# Patient Record
Sex: MALE | Race: WHITE | NOT HISPANIC OR LATINO | Employment: OTHER | URBAN - METROPOLITAN AREA
[De-identification: names, ages, dates, MRNs, and addresses within clinical notes are randomized per-mention and may not be internally consistent; named-entity substitution may affect disease eponyms.]

---

## 2018-12-07 ENCOUNTER — OFFICE VISIT (OUTPATIENT)
Dept: INTERNAL MEDICINE | Facility: CLINIC | Age: 73
End: 2018-12-07

## 2018-12-07 VITALS
OXYGEN SATURATION: 98 % | RESPIRATION RATE: 14 BRPM | HEART RATE: 77 BPM | HEIGHT: 71 IN | SYSTOLIC BLOOD PRESSURE: 144 MMHG | DIASTOLIC BLOOD PRESSURE: 84 MMHG | WEIGHT: 173 LBS | BODY MASS INDEX: 24.22 KG/M2

## 2018-12-07 DIAGNOSIS — D36.9 TUBULAR ADENOMA: Primary | ICD-10-CM

## 2018-12-07 DIAGNOSIS — N52.1 ERECTILE DYSFUNCTION DUE TO DISEASES CLASSIFIED ELSEWHERE: ICD-10-CM

## 2018-12-07 DIAGNOSIS — L57.0 ACTINIC KERATOSIS: ICD-10-CM

## 2018-12-07 PROCEDURE — 99204 OFFICE O/P NEW MOD 45 MIN: CPT | Performed by: INTERNAL MEDICINE

## 2018-12-07 NOTE — PROGRESS NOTES
"      Zachary Zayas is a 73 y.o. male, who presents with a chief complaint of   Chief Complaint   Patient presents with   • Establish Care   • Erectile Dysfunction       HPI       74 yo male with pmhx possible BPH, Asthma and allergy ([facility] - every other week),   Had CPE this year previously seeing Dr. Stuart correa blood work up to date.      Gets his medication from [facility].     Strong family history of CAD, father with AMI at 43      The following portions of the patient's history were reviewed and updated as appropriate: allergies, current medications, past family history, past medical history, past social history, past surgical history and problem list.    Allergies: Lactose intolerance (gi)    Current Outpatient Medications:   •  Calcium Carb-Cholecalciferol (CALCIUM 600 + D PO), Take  by mouth., Disp: , Rfl:   •  GLUCOSAMINE CHONDROITIN COMPLX PO, Take  by mouth., Disp: , Rfl:   •  Lysine HCl (L-LYSINE) 500 MG tablet tablet, Take  by mouth daily., Disp: , Rfl:   •  Misc Natural Products (SAW PALMETTO COMPLEX EX ST PO), Take  by mouth., Disp: , Rfl:   •  tadalafil (CIALIS) 10 MG tablet, Take 10 mg by mouth daily as needed for erectile dysfunction., Disp: , Rfl:   There are no discontinued medications.    Review of Systems   Constitutional: Negative.    HENT: Negative.    Respiratory: Negative.    Cardiovascular: Negative.    Gastrointestinal: Negative.    Genitourinary: Negative.    Musculoskeletal: Negative.  Negative for arthralgias and myalgias.   Neurological: Negative.    Hematological: Negative.    Psychiatric/Behavioral: Negative.              /84   Pulse 77   Resp 14   Ht 180.3 cm (71\")   Wt 78.5 kg (173 lb)   SpO2 98%   BMI 24.13 kg/m²       Physical Exam   Constitutional: He is oriented to person, place, and time. He appears well-developed and well-nourished.   HENT:   Head: Normocephalic and atraumatic.   Right Ear: External ear normal.   Left Ear: External ear normal.   Nose: Nose " normal.   Mouth/Throat: Oropharynx is clear and moist. No oropharyngeal exudate.   Eyes: Conjunctivae and EOM are normal. Pupils are equal, round, and reactive to light. Right eye exhibits no discharge. Left eye exhibits no discharge.   Neck: Normal range of motion. Neck supple. No thyromegaly present.   Cardiovascular: Normal rate, regular rhythm and intact distal pulses. Exam reveals no friction rub.   No murmur heard.  Pulmonary/Chest: Effort normal and breath sounds normal.   Abdominal: Soft. Bowel sounds are normal. He exhibits no distension.   Musculoskeletal: Normal range of motion. He exhibits no edema.   Neurological: He is alert and oriented to person, place, and time. He has normal reflexes. No cranial nerve deficit.   Skin: Skin is warm and dry. Capillary refill takes less than 2 seconds.   Actinic ekeratosis   Psychiatric: He has a normal mood and affect. His behavior is normal. Judgment and thought content normal.   Nursing note and vitals reviewed.      Lab Results (most recent)     None          Results for orders placed or performed in visit on 10/13/16   Comprehensive Metabolic Panel   Result Value Ref Range    Glucose 80 65 - 99 mg/dL    BUN 12 8 - 23 mg/dL    Creatinine 0.88 0.76 - 1.27 mg/dL    eGFR Non African Am 86 >60 mL/min/1.73    eGFR African Am 104 >60 mL/min/1.73    BUN/Creatinine Ratio 13.6 7.0 - 25.0    Sodium 144 136 - 145 mmol/L    Potassium 4.9 3.5 - 5.2 mmol/L    Chloride 105 98 - 107 mmol/L    Total CO2 25.8 22.0 - 29.0 mmol/L    Calcium 9.6 8.6 - 10.5 mg/dL    Total Protein 6.3 6.0 - 8.5 g/dL    Albumin 4.30 3.50 - 5.20 g/dL    Globulin 2.0 gm/dL    A/G Ratio 2.2 g/dL    Total Bilirubin 0.8 0.1 - 1.2 mg/dL    Alkaline Phosphatase 64 39 - 117 U/L    AST (SGOT) 18 1 - 40 U/L    ALT (SGPT) 20 1 - 41 U/L   Vitamin D 25 Hydroxy   Result Value Ref Range    25 Hydroxy, Vitamin D 38.9 30.0 - 100.0 ng/mL   TSH   Result Value Ref Range    TSH 2.070 0.270 - 4.200 mIU/mL   Thyroid Antibodies    Result Value Ref Range    Thyroid Peroxidase Antibody 12 0 - 34 IU/mL    Thyroglobulin Ab <1.0 0.0 - 0.9 IU/mL   Thyroglobulin   Result Value Ref Range    Thyroglobulin (TG-LIZ) 19 ng/mL   T4, Free   Result Value Ref Range    Free T4 1.08 0.93 - 1.70 ng/dL   T4   Result Value Ref Range    T4, Total 5.58 4.50 - 11.70 mcg/dL   T3, Free   Result Value Ref Range    T3, Free 3.1 2.0 - 4.4 pg/mL   T3   Result Value Ref Range    T3, Total 95.3 80.0 - 200.0 ng/dL   Calcitonin   Result Value Ref Range    Calcitonin <2.0 0.0 - 8.4 pg/mL   Luteinizing Hormone   Result Value Ref Range    LH 5.4 1.7 - 8.6 mIU/mL   Follicle Stimulating Hormone   Result Value Ref Range    FSH 2.4 1.5 - 12.4 mIU/mL   Testosterone, Free, Total   Result Value Ref Range    Testosterone, Total 575 348 - 1,197 ng/dL    Comment Comment     Testosterone, Free 8.7 6.6 - 18.1 pg/mL           Zachary was seen today for establish care and erectile dysfunction.    Diagnoses and all orders for this visit:    Tubular adenoma  -     Ambulatory Referral to Gastroenterology    Erectile dysfunction due to diseases classified elsewhere    Actinic keratosis      Needs skin check, has diffuse poikilodermia on face and L ear.     Return in about 6 months (around 6/7/2019).  Patient reports prevnar shot - patient reported around 2016  Declines flu shots now  Requent records today.  Colonoscopy due now, tubular adenoma  Spent 25 min in care of patient  John Jane MD  12/07/2018

## 2018-12-07 NOTE — PATIENT INSTRUCTIONS
Zachary was seen today for establish care and erectile dysfunction.    Diagnoses and all orders for this visit:    Tubular adenoma  -     Ambulatory Referral to Gastroenterology    Erectile dysfunction due to diseases classified elsewhere    Actinic keratosis      Needs skin check, has diffuse poikilodermia on face and L ear.     Return in about 6 months (around 6/7/2019).  Patient reports prevnar shot - patient reported around 2016  Declines flu shots now  Requent records today.  Colonoscopy due now, tubular adenoma  Spent 25 min in care of patient  John Jane MD  12/07/2018

## 2018-12-13 ENCOUNTER — PREP FOR SURGERY (OUTPATIENT)
Dept: OTHER | Facility: HOSPITAL | Age: 73
End: 2018-12-13

## 2018-12-13 ENCOUNTER — TELEPHONE (OUTPATIENT)
Dept: SURGERY | Facility: CLINIC | Age: 73
End: 2018-12-13

## 2018-12-13 DIAGNOSIS — D12.6 ADENOMATOUS POLYP OF COLON, UNSPECIFIED PART OF COLON: Primary | ICD-10-CM

## 2018-12-16 ENCOUNTER — PREP FOR SURGERY (OUTPATIENT)
Dept: OTHER | Facility: HOSPITAL | Age: 73
End: 2018-12-16

## 2018-12-16 DIAGNOSIS — D12.6 ADENOMATOUS POLYP OF COLON, UNSPECIFIED PART OF COLON: Primary | ICD-10-CM

## 2018-12-17 ENCOUNTER — TELEPHONE (OUTPATIENT)
Dept: GASTROENTEROLOGY | Facility: CLINIC | Age: 73
End: 2018-12-17

## 2018-12-17 PROBLEM — D12.6 ADENOMATOUS POLYP OF COLON: Status: ACTIVE | Noted: 2018-12-17

## 2018-12-17 NOTE — TELEPHONE ENCOUNTER
SCHEDULE [facility] 03/06/2019.  NEEDS TO MOVE WILL BE FLIGHT HOME ON 03/05/2019.  RESCHEDULED TO 03/08/2019 AT 1:45PM - ARRIVE 12:30PM.  MAILED REVISED INSTRUCTIONS.

## 2019-03-07 ENCOUNTER — ANESTHESIA EVENT (OUTPATIENT)
Dept: PERIOP | Facility: HOSPITAL | Age: 74
End: 2019-03-07

## 2019-03-07 RX ORDER — CETIRIZINE HYDROCHLORIDE 10 MG/1
5 TABLET ORAL AS NEEDED
COMMUNITY

## 2019-03-07 RX ORDER — ASPIRIN 325 MG
325 TABLET ORAL AS NEEDED
COMMUNITY
End: 2020-01-12

## 2019-03-08 ENCOUNTER — ANESTHESIA (OUTPATIENT)
Dept: PERIOP | Facility: HOSPITAL | Age: 74
End: 2019-03-08

## 2019-03-08 ENCOUNTER — HOSPITAL ENCOUNTER (OUTPATIENT)
Facility: HOSPITAL | Age: 74
Setting detail: HOSPITAL OUTPATIENT SURGERY
Discharge: HOME | End: 2019-03-08
Attending: INTERNAL MEDICINE | Admitting: INTERNAL MEDICINE

## 2019-03-08 VITALS
RESPIRATION RATE: 18 BRPM | OXYGEN SATURATION: 97 % | HEIGHT: 71 IN | BODY MASS INDEX: 23.66 KG/M2 | DIASTOLIC BLOOD PRESSURE: 91 MMHG | TEMPERATURE: 97.8 F | HEART RATE: 72 BPM | SYSTOLIC BLOOD PRESSURE: 143 MMHG | WEIGHT: 169 LBS

## 2019-03-08 DIAGNOSIS — D12.6 ADENOMATOUS POLYP OF COLON, UNSPECIFIED PART OF COLON: ICD-10-CM

## 2019-03-08 PROCEDURE — 45385 COLONOSCOPY W/LESION REMOVAL: CPT | Performed by: INTERNAL MEDICINE

## 2019-03-08 PROCEDURE — 45380 COLONOSCOPY AND BIOPSY: CPT | Performed by: INTERNAL MEDICINE

## 2019-03-08 PROCEDURE — 25010000002 PROPOFOL 10 MG/ML EMULSION: Performed by: NURSE ANESTHETIST, CERTIFIED REGISTERED

## 2019-03-08 PROCEDURE — 88305 TISSUE EXAM BY PATHOLOGIST: CPT | Performed by: INTERNAL MEDICINE

## 2019-03-08 RX ORDER — SODIUM CHLORIDE 0.9 % (FLUSH) 0.9 %
3 SYRINGE (ML) INJECTION EVERY 12 HOURS SCHEDULED
Status: DISCONTINUED | OUTPATIENT
Start: 2019-03-08 | End: 2019-03-08 | Stop reason: HOSPADM

## 2019-03-08 RX ORDER — SODIUM CHLORIDE 0.9 % (FLUSH) 0.9 %
1-10 SYRINGE (ML) INJECTION AS NEEDED
Status: DISCONTINUED | OUTPATIENT
Start: 2019-03-08 | End: 2019-03-08 | Stop reason: HOSPADM

## 2019-03-08 RX ORDER — GLYCOPYRROLATE 0.2 MG/ML
INJECTION INTRAMUSCULAR; INTRAVENOUS AS NEEDED
Status: DISCONTINUED | OUTPATIENT
Start: 2019-03-08 | End: 2019-03-08 | Stop reason: SURG

## 2019-03-08 RX ORDER — LIDOCAINE HYDROCHLORIDE 10 MG/ML
0.5 INJECTION, SOLUTION EPIDURAL; INFILTRATION; INTRACAUDAL; PERINEURAL ONCE AS NEEDED
Status: DISCONTINUED | OUTPATIENT
Start: 2019-03-08 | End: 2019-03-08 | Stop reason: HOSPADM

## 2019-03-08 RX ORDER — PROPOFOL 10 MG/ML
VIAL (ML) INTRAVENOUS AS NEEDED
Status: DISCONTINUED | OUTPATIENT
Start: 2019-03-08 | End: 2019-03-08 | Stop reason: SURG

## 2019-03-08 RX ORDER — MEPERIDINE HYDROCHLORIDE 25 MG/ML
12.5 INJECTION INTRAMUSCULAR; INTRAVENOUS; SUBCUTANEOUS
Status: CANCELLED | OUTPATIENT
Start: 2019-03-08 | End: 2019-03-09

## 2019-03-08 RX ORDER — SODIUM CHLORIDE, SODIUM LACTATE, POTASSIUM CHLORIDE, CALCIUM CHLORIDE 600; 310; 30; 20 MG/100ML; MG/100ML; MG/100ML; MG/100ML
9 INJECTION, SOLUTION INTRAVENOUS CONTINUOUS
Status: DISCONTINUED | OUTPATIENT
Start: 2019-03-08 | End: 2019-03-08 | Stop reason: HOSPADM

## 2019-03-08 RX ORDER — SODIUM CHLORIDE 9 MG/ML
40 INJECTION, SOLUTION INTRAVENOUS AS NEEDED
Status: DISCONTINUED | OUTPATIENT
Start: 2019-03-08 | End: 2019-03-08 | Stop reason: HOSPADM

## 2019-03-08 RX ORDER — SODIUM CHLORIDE, SODIUM LACTATE, POTASSIUM CHLORIDE, CALCIUM CHLORIDE 600; 310; 30; 20 MG/100ML; MG/100ML; MG/100ML; MG/100ML
100 INJECTION, SOLUTION INTRAVENOUS CONTINUOUS
Status: CANCELLED | OUTPATIENT
Start: 2019-03-08

## 2019-03-08 RX ORDER — ONDANSETRON 2 MG/ML
4 INJECTION INTRAMUSCULAR; INTRAVENOUS ONCE AS NEEDED
Status: CANCELLED | OUTPATIENT
Start: 2019-03-08

## 2019-03-08 RX ORDER — LIDOCAINE HYDROCHLORIDE 20 MG/ML
INJECTION, SOLUTION INFILTRATION; PERINEURAL AS NEEDED
Status: DISCONTINUED | OUTPATIENT
Start: 2019-03-08 | End: 2019-03-08 | Stop reason: SURG

## 2019-03-08 RX ADMIN — PROPOFOL 50 MG: 10 INJECTION, EMULSION INTRAVENOUS at 14:09

## 2019-03-08 RX ADMIN — GLYCOPYRROLATE 0.2 MG: 0.2 INJECTION INTRAMUSCULAR; INTRAVENOUS at 14:10

## 2019-03-08 RX ADMIN — PROPOFOL 50 MG: 10 INJECTION, EMULSION INTRAVENOUS at 14:12

## 2019-03-08 RX ADMIN — PROPOFOL 50 MG: 10 INJECTION, EMULSION INTRAVENOUS at 14:04

## 2019-03-08 RX ADMIN — LIDOCAINE HYDROCHLORIDE 100 MG: 20 INJECTION, SOLUTION INFILTRATION; PERINEURAL at 14:01

## 2019-03-08 RX ADMIN — PROPOFOL 50 MG: 10 INJECTION, EMULSION INTRAVENOUS at 14:01

## 2019-03-08 RX ADMIN — SODIUM CHLORIDE, POTASSIUM CHLORIDE, SODIUM LACTATE AND CALCIUM CHLORIDE: 600; 310; 30; 20 INJECTION, SOLUTION INTRAVENOUS at 13:57

## 2019-03-08 RX ADMIN — PROPOFOL 50 MG: 10 INJECTION, EMULSION INTRAVENOUS at 14:21

## 2019-03-08 NOTE — ANESTHESIA PREPROCEDURE EVALUATION
Anesthesia Evaluation     Patient summary reviewed and Nursing notes reviewed   no history of anesthetic complications:  NPO Solid Status: > 8 hours  NPO Liquid Status: > 4 hours           Airway   Mallampati: II  TM distance: >3 FB  Neck ROM: limited  No difficulty expected  Dental - normal exam     Pulmonary - normal exam    breath sounds clear to auscultation  Cardiovascular - negative cardio ROS and normal exam    Rhythm: regular  Rate: normal        Neuro/Psych- negative ROS  GI/Hepatic/Renal/Endo - negative ROS     Musculoskeletal     Abdominal  - normal exam   Substance History - negative use     OB/GYN          Other   (+) arthritis     Blood dyscrasia: last took ASA 7 days ago.                  Anesthesia Plan    ASA 2     MAC     intravenous induction   Anesthetic plan, all risks, benefits, and alternatives have been provided, discussed and informed consent has been obtained with: patient.  Use of blood products discussed with patient  Consented to blood products.

## 2019-03-08 NOTE — OP NOTE
COLONOSCOPY  Procedure Report    Patient Name:  Zachary Zayas  YOB: 1945    Date of Surgery:  3/8/2019     Indications:  Previous Polyp    Pre-op Diagnosis:   Adenomatous polyp of colon, unspecified part of colon [D12.6]    Post-Op Diagnosis Codes:     * Adenomatous polyp of colon, unspecified part of colon [D12.6]     * Colon polyp [K63.5]     * Diverticulosis [K57.90]         Procedure/CPT® Codes:      Procedure(s):  COLONOSCOPY with polypectomy    Staff:  Surgeon(s):  Fred Fang MD         Anesthesia: Monitor Anesthesia Care    Estimated Blood Loss: none    Specimens:   ID Type Source Tests Collected by Time   A : sigmoid colon polyp Polyp Large Intestine, Sigmoid Colon TISSUE PATHOLOGY EXAM Fred Fang MD 3/8/2019 1412   B : cecal polyps x2 Polyp Large Intestine, Cecum TISSUE PATHOLOGY EXAM Fred Fang MD 3/8/2019 1414   C : ascending colon polyps x3 Polyp Large Intestine, Right / Ascending Colon TISSUE PATHOLOGY EXAM Fred Fang MD 3/8/2019 1417       Implants:    Nothing was implanted during the procedure      Description of Procedure: After having signed informed consent, he was brought to the endoscopy suite and placed in left lateral decubitus position, given his IV sedation.  Rectal exam revealed no external lesions, normal anal tone, moderately enlarged prostate without nodules. Scope was introduced into rectum and advanced under direct visualization with ease through the rectum, sigmoid colon, past scattered diverticulum. These extended throughout the colon. Scope was advanced to the proximal sigmoid colon where a sessile polyp was identified and removed with cold snare technique. It was 6-7 mm in size. It was sent separately as a sigmoid colon polyp. Scope was advanced through the remainder of the sigmoid, descending colon, to and around the splenic flexure, reduced through the transverse colon with some looping, to and  around the hepatic flexure, reduced in the ascending colon, and then advanced in the cecum using rotational technique. The cecum was identified by appendiceal orifice and ileocecal valve. The ileocecal valve was briefly intubated. Terminal ileum was normal appearing. Scope was drawn back into the ascending colon and advanced into the cecum. There were 2 polyps in the cecum, one was a diminutive 3 mm polyp, the 2nd was a sessile 6 mm polyp. They were both removed with cold snare technique and sent together as cecal polyps x2. Scope was withdrawn back into the ascending colon, withdrawn slowly. I examined the colon in circumferential fashion. Within the ascending colon, there were three 4-5 mm polyps that were removed with cold biopsy forceps, sent together as ascending colon polyps x3. Scope was withdrawn through the remainder of the colon. No further mucosal lesions were noted at this site. Sigmoid polypectomy showed excellent hemostasis. No residual polyps. Scope was withdrawn in the rectum and retroflexed revealing intact dentate line and no mucosal lesions. Scope was de-retroflexed and withdrawn. Patient tolerated procedure very well.          Findings: Colon to Cecum Good Prep  Pan Diverticulosis  Polyps (6) Cold Snare/Biopsy      Complications: None    Recommendations: Results to be called; Repeat in 3 years      Fred Fang MD     Date: 3/8/2019  Time: 2:31 PM

## 2019-03-08 NOTE — BRIEF OP NOTE
COLONOSCOPY  Progress Note    Zachary Zayas  3/8/2019    Pre-op Diagnosis:   Adenomatous polyp of colon, unspecified part of colon [D12.6]       Post-Op Diagnosis Codes:     * Adenomatous polyp of colon, unspecified part of colon [D12.6]     * Colon polyp [K63.5]     * Diverticulosis [K57.90]    Procedure/CPT® Codes:      Procedure(s):  COLONOSCOPY with polypectomy    Surgeon(s):  Fred Fang MD    Anesthesia: Monitor Anesthesia Care    Staff:   Circulator: Joi Hadley RN  Scrub Person: Deisi Gayle; Malorie Wilson    Estimated Blood Loss: none    Urine Voided: * No values recorded between 3/8/2019  1:56 PM and 3/8/2019  2:25 PM *    Specimens:                ID Type Source Tests Collected by Time   A : sigmoid colon polyp Polyp Large Intestine, Sigmoid Colon TISSUE PATHOLOGY EXAM Fred Fang MD 3/8/2019 1412   B : cecal polyps x2 Polyp Large Intestine, Cecum TISSUE PATHOLOGY EXAM Fred Fang MD 3/8/2019 1414   C : ascending colon polyps x3 Polyp Large Intestine, Right / Ascending Colon TISSUE PATHOLOGY EXAM Fred Fang MD 3/8/2019 1417         Drains:      Findings: Colon to Cecum Good Prep  Pan Diverticulosis  Polyps (6) Cold Snare/Biopsy    Complications: None      Fred Fang MD     Date: 3/8/2019  Time: 2:30 PM

## 2019-03-08 NOTE — ANESTHESIA POSTPROCEDURE EVALUATION
Patient: Zachary Zayas    Procedure Summary     Date:  03/08/19 Room / Location:  [facility] ENDOSCOPY 1 /  [facility]    Anesthesia Start:  1357 Anesthesia Stop:  1429    Procedure:  COLONOSCOPY with polypectomy (N/A ) Diagnosis:       Adenomatous polyp of colon, unspecified part of colon      Colon polyp      Diverticulosis      (Adenomatous polyp of colon, unspecified part of colon [D12.6])    Surgeon:  Fred Fang MD Provider:  Eloisa Farooq CRNA    Anesthesia Type:  MAC ASA Status:  2          Anesthesia Type: MAC  Last vitals  BP   121/84 (03/08/19 1441)   Temp   97.8 °F (36.6 °C) (03/08/19 1431)   Pulse   78 (03/08/19 1441)   Resp   17 (03/08/19 1441)     SpO2   96 % (03/08/19 1441)     Post Anesthesia Care and Evaluation    Patient location during evaluation: bedside  Patient participation: complete - patient participated  Level of consciousness: awake and alert  Pain score: 0  Pain management: adequate  Airway patency: patent  Anesthetic complications: No anesthetic complications    Cardiovascular status: acceptable  Respiratory status: acceptable  Hydration status: acceptable

## 2019-03-08 NOTE — H&P
"Patient Care Team:  Provider, No Known as PCP - Wei Bernal, OD as PCP - Claims Attributed    CHIEF COMPLAINT: Personal History of Colon polyps    HISTORY OF PRESENT ILLNESS:    Last exam 2014      Past Medical History:   Diagnosis Date   • Arthritis    • Asthma    • Colon polyps    • ED (erectile dysfunction)    • Thyroid nodule      Past Surgical History:   Procedure Laterality Date   • HERNIA REPAIR     • NOSE SURGERY     • SHOULDER LIGAMENT REPAIR     • TONSILLECTOMY     • TOTAL HIP ARTHROPLASTY Right 2000     History reviewed. No pertinent family history.  Social History     Tobacco Use   • Smoking status: Never Smoker   • Smokeless tobacco: Never Used   Substance Use Topics   • Alcohol use: No   • Drug use: Defer     Medications Prior to Admission   Medication Sig Dispense Refill Last Dose   • aspirin 325 MG tablet Take 325 mg by mouth As Needed for Mild Pain .      • cetirizine (zyrTEC) 10 MG tablet Take 5 mg by mouth As Needed for Allergies.      • Calcium Carb-Cholecalciferol (CALCIUM 600 + D PO) Take  by mouth.   Not Taking   • GLUCOSAMINE CHONDROITIN COMPLX PO Take  by mouth.   Not Taking   • Lysine HCl (L-LYSINE) 500 MG tablet tablet Take  by mouth daily.   Not Taking   • Misc Natural Products (SAW PALMETTO COMPLEX EX ST PO) Take  by mouth.   Not Taking   • tadalafil (CIALIS) 10 MG tablet Take 10 mg by mouth daily as needed for erectile dysfunction.   Not Taking     Allergies:  Lactose intolerance (gi)    REVIEW OF SYSTEMS:  Please see the above history of present illness for pertinent positives and negatives.  The remainder of the patient's systems have been reviewed and are negative.     Vital Signs       Flowsheet Rows      First Filed Value   Admission Height  180.3 cm (71\") Documented at 03/07/2019 1403   Admission Weight  76.7 kg (169 lb) Documented at 03/07/2019 1403           Physical Exam:  Physical Exam   Constitutional: Patient appears well-developed and well-nourished and in no " acute distress   HEENT:   Head: Normocephalic and atraumatic.   Eyes:  Pupils are equal, round, and reactive to light. EOM are intact. Sclerae are anicteric and non-injected.  Mouth and Throat: Patient has moist mucous membranes. Oropharynx is clear of any erythema or exudate.     Neck: Neck supple. No JVD present. No thyromegaly present. No lymphadenopathy present.  Cardiovascular: Regular rate, regular rhythm, S1 normal and S2 normal.  Exam reveals no gallop and no friction rub.  No murmur heard.  Pulmonary/Chest: Lungs are clear to auscultation bilaterally. No respiratory distress. No wheezes. No rhonchi. No rales.   Abdominal: Soft. Bowel sounds are normal. No distension and no mass. There is no hepatosplenomegaly. There is no tenderness.   Musculoskeletal: Normal Muscle tone  Extremities: No edema. Pulses are palpable in all 4 extremities.  Neurological: Patient is alert and oriented to person, place, and time. Cranial nerves II-XII are grossly intact with no focal deficits.  Skin: Skin is warm. No rash noted. Nails show no clubbing.  No cyanosis or erythema.    Debilities/Disabilities Identified: None  Emotional Behavior: Appropriate     Results Review:    I reviewed the patient's new clinical results.  Lab Results (most recent)     None          Imaging Results (most recent)     None        reviewed    ECG/EMG Results (most recent)     None        reviewed    Assessment/Plan     Personal History of Colon Polyp/ Colonoscopy    I discussed the patients findings and my recommendations with patient.     Fred Fang MD  03/08/19  1:07 PM    Time: 10 min prior to procedure.

## 2019-03-12 LAB
CYTO UR: NORMAL
LAB AP CASE REPORT: NORMAL
PATH REPORT.FINAL DX SPEC: NORMAL
PATH REPORT.GROSS SPEC: NORMAL

## 2019-03-25 ENCOUNTER — OFFICE VISIT (OUTPATIENT)
Dept: INTERNAL MEDICINE | Facility: CLINIC | Age: 74
End: 2019-03-25

## 2019-03-25 VITALS
HEIGHT: 71 IN | WEIGHT: 171 LBS | BODY MASS INDEX: 23.94 KG/M2 | TEMPERATURE: 97.6 F | SYSTOLIC BLOOD PRESSURE: 118 MMHG | DIASTOLIC BLOOD PRESSURE: 60 MMHG | HEART RATE: 42 BPM | RESPIRATION RATE: 16 BRPM | OXYGEN SATURATION: 100 %

## 2019-03-25 DIAGNOSIS — Z12.5 SCREENING FOR PROSTATE CANCER: ICD-10-CM

## 2019-03-25 DIAGNOSIS — D36.9 TUBULAR ADENOMA: Primary | ICD-10-CM

## 2019-03-25 DIAGNOSIS — E04.1 THYROID NODULE: ICD-10-CM

## 2019-03-25 DIAGNOSIS — Z11.59 NEED FOR HEPATITIS C SCREENING TEST: ICD-10-CM

## 2019-03-25 LAB
PSA SERPL-MCNC: 3.32 NG/ML (ref 0–4)
T4 SERPL-MCNC: 5.6 MCG/DL (ref 4.5–11.7)
TSH SERPL DL<=0.005 MIU/L-ACNC: 3.2 MIU/ML (ref 0.27–4.2)

## 2019-03-25 PROCEDURE — 99213 OFFICE O/P EST LOW 20 MIN: CPT | Performed by: NURSE PRACTITIONER

## 2019-03-25 NOTE — PROGRESS NOTES
Subjective      CC: establish care for thyroid nodule    Zachary Zayas is a 73 y.o. male. He has a history of thyroid nodule and tubular adenoma. His thyroid nodule was sent for US, last complete in 2016, and he had a biopsy done with normal results. He exercises at tidy. He feels well, and denies any medical complaints.     He just completed his C-scope 3-2019 with Dr. Fang, and will need a repeat in 3 years.     History of Present Illness     The following portions of the patient's history were reviewed and updated as appropriate: allergies, current medications, past family history, past medical history, past social history, past surgical history and problem list.    Review of Systems   Constitutional: Negative.    HENT: Negative.    Eyes: Negative.    Respiratory: Negative.  Negative for shortness of breath and stridor.    Cardiovascular: Negative.    Gastrointestinal: Negative.    Endocrine: Negative.    Genitourinary: Negative.    Musculoskeletal: Negative.  Negative for arthralgias.   Skin: Negative.    Allergic/Immunologic: Negative.    Neurological: Negative.  Negative for dizziness, facial asymmetry, light-headedness and headaches.   Hematological: Negative.  Negative for adenopathy. Does not bruise/bleed easily.   Psychiatric/Behavioral: Negative.  Negative for agitation.       Objective   Physical Exam   Constitutional: He is oriented to person, place, and time. He appears well-developed and well-nourished.   HENT:   Head: Normocephalic.   Right Ear: External ear normal.   Left Ear: External ear normal.   Nose: Nose normal.   Mouth/Throat: Oropharynx is clear and moist.   Neck: Neck supple. Thyromegaly present.   Cardiovascular: Normal rate, regular rhythm and normal heart sounds.   No murmur heard.  Pulmonary/Chest: Effort normal and breath sounds normal. No stridor. No respiratory distress.   Musculoskeletal: He exhibits no edema.   Neurological: He is alert and oriented to person,  place, and time.   Skin: Skin is warm and dry.   Psychiatric: He has a normal mood and affect. His behavior is normal.   Vitals reviewed.      Assessment/Plan   Zachary was seen today for establish care.    Diagnoses and all orders for this visit:    Tubular adenoma    Screening for prostate cancer  -     PSA SCREENING    Thyroid nodule  -     T4 & TSH ([facility])    Need for hepatitis C screening test  -     Hepatitis C Antibody    Reviewed old records from Dr. Jane.    Will catch up preventive screenings. [facility]    Follow up with BALTAZAR

## 2019-03-26 LAB — HCV AB S/CO SERPL IA: <0.1 S/CO RATIO (ref 0–0.9)

## 2019-10-08 ENCOUNTER — OFFICE VISIT (OUTPATIENT)
Dept: INTERNAL MEDICINE | Facility: CLINIC | Age: 74
End: 2019-10-08

## 2019-10-08 VITALS
BODY MASS INDEX: 24.08 KG/M2 | HEART RATE: 68 BPM | SYSTOLIC BLOOD PRESSURE: 142 MMHG | HEIGHT: 71 IN | DIASTOLIC BLOOD PRESSURE: 64 MMHG | RESPIRATION RATE: 16 BRPM | TEMPERATURE: 98.3 F | OXYGEN SATURATION: 98 % | WEIGHT: 172 LBS

## 2019-10-08 DIAGNOSIS — Z00.00 MEDICARE ANNUAL WELLNESS VISIT, INITIAL: ICD-10-CM

## 2019-10-08 PROBLEM — I83.892 VARICOSE VEINS OF LEFT LEG WITH EDEMA: Status: ACTIVE | Noted: 2019-10-08

## 2019-10-08 PROBLEM — H91.93 BILATERAL HEARING LOSS: Status: ACTIVE | Noted: 2019-10-08

## 2019-10-08 PROCEDURE — G0439 PPPS, SUBSEQ VISIT: HCPCS | Performed by: NURSE PRACTITIONER

## 2019-10-08 RX ORDER — KETOTIFEN FUMARATE 0.35 MG/ML
1 SOLUTION/ DROPS OPHTHALMIC 2 TIMES DAILY
Status: ON HOLD | COMMUNITY
End: 2020-02-17

## 2019-10-08 RX ORDER — ACETAMINOPHEN 500 MG
500 TABLET ORAL EVERY 6 HOURS PRN
COMMUNITY

## 2019-10-08 NOTE — PROGRESS NOTES
QUICK REFERENCE INFORMATION:  The ABCs of Providing the Annual Wellness Visit   CMS[employer] Medicare Subsequent Wellness Visit      Subjective   History of Present Illness    Zachary Zayas is a 74 y.o. male who presents for an Subsequent Wellness Visit. In addition, we addressed the following health issues:    none      PMH, PSH, SocHx, FamHx, Allergies, and Medications: Reviewed and updated in the Visit Navigator.     Outpatient Medications Prior to Visit   Medication Sig Dispense Refill   • acetaminophen (TYLENOL) 500 MG tablet Take 500 mg by mouth Every 6 (Six) Hours As Needed for Mild Pain .     • aspirin 325 MG tablet Take 325 mg by mouth As Needed for Mild Pain .     • Calcium Carb-Cholecalciferol (CALCIUM 600 + D PO) Take  by mouth.     • cetirizine (zyrTEC) 10 MG tablet Take 5 mg by mouth As Needed for Allergies.     • GLUCOSAMINE CHONDROITIN COMPLX PO Take  by mouth.     • hypromellose (ARTIFICIAL TEARS) 0.4 % solution 1 drop As Needed for Dry Eyes.     • ketotifen (KP KETOTIFEN FUMARATE) 0.025 % ophthalmic solution 1 drop 2 (Two) Times a Day.     • Lysine HCl (L-LYSINE) 500 MG tablet tablet Take  by mouth daily.     • Misc Natural Products (SAW PALMETTO COMPLEX EX ST PO) Take  by mouth.     • PATIENT SUPPLIED ALLERGY INJECTION Inject  under the skin into the appropriate area as directed 1 (One) Time.     • Pyridoxine HCl (VITAMIN B6 PO) Take  by mouth.     • tadalafil (CIALIS) 10 MG tablet Take 10 mg by mouth daily as needed for erectile dysfunction.       No facility-administered medications prior to visit.        Patient Active Problem List   Diagnosis   • Benign non-nodular prostatic hyperplasia without lower urinary tract symptoms   • Chronic fatigue   • Solitary thyroid nodule   • Erectile dysfunction   • S/P hip replacement   • Tubular adenoma   • Actinic keratosis   • Adenomatous polyp of colon       Health Habits:  Dental Exam. up to date  Eye Exam. up to date  Exercise: 3  times/week.  Current exercise activities include: [employer]    Social:  See review in SnapShot activity and in SocHx section of Visit Navigator.    Health Risk Assessment:  The patient has completed a Health Risk Assessment. This has been reviewed with them and has been scanned into Media Manager as a separate document.    Current Medical Providers:  Patient Care Team:  Marianne Wasserman APRN as PCP - General (Family Medicine)  Wei Richards OD as PCP - Claims Attributed    The [facility] providers who are involved in the care of this patient are listed above. Additional providers and suppliers are listed below:  Dr. Fang    Recent Hospitalizations:  No hospitalization(s) within the last year..    Age-appropriate Screening Schedule:  Refer to the list below for future screening recommendations based on patient's age. Orders for these recommended tests are listed in the plan section. The patient has been provided with a written plan.    Health Maintenance   Topic Date Due   • TDAP/TD VACCINES (1 - Tdap) 04/21/1966   • ZOSTER VACCINE (1 of 2) 04/21/1997   • PNEUMOCOCCAL VACCINES (65+ LOW/MEDIUM RISK) (2 of 2 - PPSV23) 09/01/2017   • INFLUENZA VACCINE  08/01/2019   • MEDICARE ANNUAL WELLNESS  08/09/2019   • COLONOSCOPY  03/08/2022   • HEPATITIS C SCREENING  Completed       Depression Screen:   PHQ-2/PHQ-9 Depression Screening 10/8/2019   Little interest or pleasure in doing things 0   Feeling down, depressed, or hopeless 0   Trouble falling or staying asleep, or sleeping too much 0   Feeling tired or having little energy 0   Poor appetite or overeating 0   Feeling bad about yourself - or that you are a failure or have let yourself or your family down 0   Trouble concentrating on things, such as reading the newspaper or watching television 0   Moving or speaking so slowly that other people could have noticed. Or the opposite - being so fidgety or restless that you have been moving around a lot more than  usual 0   Thoughts that you would be better off dead, or of hurting yourself in some way 0   Total Score 0   If you checked off any problems, how difficult have these problems made it for you to do your work, take care of things at home, or get along with other people? Not difficult at all       Functional and Cognitive Screening:  Functional & Cognitive Status 10/8/2019   Do you have difficulty preparing food and eating? No   Do you have difficulty bathing yourself, getting dressed or grooming yourself? No   Do you have difficulty using the toilet? No   Do you have difficulty moving around from place to place? No   Do you have trouble with steps or getting out of a bed or a chair? No   Current Diet Well Balanced Diet   Dental Exam Up to date   Eye Exam Up to date   Exercise (times per week) 3 times per week   Current Exercise Activities Include Weightlifting   Do you need help using the phone?  No   Are you deaf or do you have serious difficulty hearing?  No   Do you need help with transportation? No   Do you need help shopping? No   Do you need help preparing meals?  No   Do you need help with housework?  No   Do you need help with laundry? No   Do you need help taking your medications? No   Do you need help managing money? No   Do you ever drive or ride in a car without wearing a seat belt? No   Have you felt unusual stress, anger or loneliness in the last month? No   Who do you live with? Spouse   If you need help, do you have trouble finding someone available to you? No   Have you been bothered in the last four weeks by sexual problems? No   Do you have difficulty concentrating, remembering or making decisions? No       Does the patient have evidence of cognitive impairment? No    Identification of Risk Factors:  Risk factors include: Alcohol Misuse  Breast Cancer/Mammogram Screening  Cardiovascular risk  Chronic Pain   Glaucoma Risk  Hearing Problem.    Review of Systems   Constitutional: Negative.    HENT:  "Positive for hearing loss.    Respiratory: Negative.    Endocrine: Negative.    Genitourinary: Negative.    Musculoskeletal: Positive for arthralgias. Negative for back pain, gait problem, joint swelling and myalgias.   Allergic/Immunologic: Negative.    Neurological: Negative.    Hematological: Negative.    Psychiatric/Behavioral: Negative.        /64   Pulse 68   Temp 98.3 °F (36.8 °C) (Oral)   Resp 16   Ht 180.3 cm (71\")   Wt 78 kg (172 lb)   SpO2 98%   BMI 23.99 kg/m²   General appearance: alert, appears stated age and cooperative  Head: Normocephalic, without obvious abnormality, atraumatic  Ears: normal TM's and external ear canals both ears  Nose: Nares normal. Septum midline. Mucosa normal. No drainage or sinus tenderness.  Throat: lips, mucosa, and tongue normal; teeth and gums normal  Neck: no adenopathy, no carotid bruit, no JVD, supple, symmetrical, trachea midline and thyroid not enlarged, symmetric, no tenderness/mass/nodules  Back: symmetric, no curvature. ROM normal. No CVA tenderness.  Lungs: clear to auscultation bilaterally  Chest wall: no tenderness  Heart: regular rate and rhythm, S1, S2 normal, no murmur, click, rub or gallop  Abdomen: soft, non-tender; bowel sounds normal; no masses,  no organomegaly    Objective     Vitals:    10/08/19 1116   BP: 142/64   Pulse: 68   Resp: 16   Temp: 98.3 °F (36.8 °C)   TempSrc: Oral   SpO2: 98%   Weight: 78 kg (172 lb)   Height: 180.3 cm (71\")       Body mass index is 23.99 kg/m².    Assessment/Plan   Patient Self-Management and Personalized Health Advice  The patient has been provided with information about: diet, weight management, prevention of cardiac or vascular disease, the relationship between weight and GERD and fall prevention and preventive services including:   · Alcohol Misuse Screening and Counseling  (15 minutes counseling time, Code )  · Annual Wellness Visit (AWV)  · Colorectal Cancer Screening, Colonoscopy.    Discussed the " patient's BMI with him. The BMI is in the acceptable range.    Orders:     Diagnosis Plan   1. Medicare annual wellness visit, initial       Reviewed immunizations, he declined.     Follow Up:    1 year     An After Visit Summary and PPPS with all of these plans were given to the patient.

## 2019-11-11 ENCOUNTER — OFFICE VISIT (OUTPATIENT)
Dept: INTERNAL MEDICINE | Facility: CLINIC | Age: 74
End: 2019-11-11

## 2019-11-11 ENCOUNTER — HOSPITAL ENCOUNTER (OUTPATIENT)
Dept: CARDIOLOGY | Facility: HOSPITAL | Age: 74
Discharge: HOME | End: 2019-11-11
Admitting: NURSE PRACTITIONER

## 2019-11-11 VITALS
OXYGEN SATURATION: 98 % | HEART RATE: 74 BPM | HEIGHT: 71 IN | SYSTOLIC BLOOD PRESSURE: 130 MMHG | RESPIRATION RATE: 16 BRPM | TEMPERATURE: 98.1 F | BODY MASS INDEX: 24.22 KG/M2 | WEIGHT: 173 LBS | DIASTOLIC BLOOD PRESSURE: 72 MMHG

## 2019-11-11 DIAGNOSIS — I49.8 SINUS ARRHYTHMIA: ICD-10-CM

## 2019-11-11 DIAGNOSIS — I49.3 PREMATURE VENTRICULAR CONTRACTIONS (PVCS) (VPCS): ICD-10-CM

## 2019-11-11 PROCEDURE — 99213 OFFICE O/P EST LOW 20 MIN: CPT | Performed by: NURSE PRACTITIONER

## 2019-11-11 PROCEDURE — 93225 XTRNL ECG REC<48 HRS REC: CPT

## 2019-11-11 PROCEDURE — 93000 ELECTROCARDIOGRAM COMPLETE: CPT | Performed by: NURSE PRACTITIONER

## 2019-11-11 PROCEDURE — 93226 XTRNL ECG REC<48 HR SCAN A/R: CPT

## 2019-11-11 NOTE — PATIENT INSTRUCTIONS
Premature Ventricular Contraction    A premature ventricular contraction (PVC) is a common kind of irregular heartbeat (arrhythmia). These contractions are extra heartbeats that start in the ventricles of the heart and occur too early in the normal sequence. During the PVC, the heart's normal electrical pathway is not used, so the beat is shorter and less effective. In most cases, these contractions come and go and do not require treatment.  What are the causes?  Common causes of the condition include:  · Smoking.  · Drinking alcohol.  · Certain medicines.  · Some illegal drugs.  · Stress.  · Caffeine.  Certain medical conditions can also cause PVCs:  · Heart failure.  · Heart attack, or coronary artery disease.  · Heart valve problems.  · Changes in minerals in the blood (electrolytes).  · Low blood oxygen levels or high carbon dioxide levels.  In many cases, the cause of this condition is not known.  What are the signs or symptoms?  The main symptom of this condition is fast or skipped heartbeats (palpitations). Other symptoms include:  · Chest pain.  · Shortness of breath.  · Feeling tired.  · Dizziness.  · Difficulty exercising.  In some cases, there are no symptoms.  How is this diagnosed?  This condition may be diagnosed based on:  · Your medical history.  · A physical exam. During the exam, the health care provider will check for irregular heartbeats.  · Tests, such as:  ? An ECG (electrocardiogram) to monitor the electrical activity of your heart.  ? Ambulatory cardiac monitor. This device records your heartbeats for 24 hours or more.  ? Stress tests to see how exercise affects your heart rhythm and blood supply.  ? Echocardiogram. This test uses sound waves (ultrasound) to produce an image of your heart.  ? Electrophysiology study (EPS). This test checks for electrical problems in your heart.  How is this treated?  Treatment for this condition depends on any underlying conditions, the type of PVCs that you  are having, and how much the symptoms are interfering with your daily life.  Possible treatments include:  · Avoiding things that cause premature contractions (triggers). These include caffeine and alcohol.  · Taking medicines if symptoms are severe or if the extra heartbeats are frequent.  · Getting treatment for underlying conditions that cause PVCs.  · Having an implantable cardioverter defibrillator (ICD), if you are at risk for a serious arrhythmia. The ICD is a small device that is inserted into your chest to monitor your heartbeat. When it senses an irregular heartbeat, it sends a shock to bring the heartbeat back to normal.  · Having a procedure to destroy the portion of the heart tissue that sends out abnormal signals (catheter ablation).  In some cases, no treatment is required.  Follow these instructions at home:  Alcohol use    · Do not drink alcohol if:  ? Your health care provider tells you not to drink.  ? You are pregnant, may be pregnant, or are planning to become pregnant.  ? Alcohol triggers your episodes.  · If you drink alcohol, limit how much you have. You may drink:  ? 0-1 drink a day for women.  ? 0-2 drinks a day for men.  · Be aware of how much alcohol is in your drink. In the [place] one drink equals one typical bottle of beer (12 oz), one-half glass of wine (5 oz), or one shot of hard liquor (1½ oz).  General instructions  · Do not use any products that contain nicotine or tobacco, such as cigarettes and e-cigarettes. If you need help quitting, ask your health care provider.  · Find healthy ways to manage stress. Avoid stressful situations when possible.  · Exercise regularly. Ask your health care provider what type of exercise is safe for you.  · Try to get at least 7-9 hours of sleep each night, or as much as recommended by your health care provider.  · If caffeine triggers episodes of PVC, do not eat, drink, or use anything with caffeine in it.  · Do not use illegal drugs.  · Take  over-the-counter and prescription medicines only as told by your health care provider.  · Keep all follow-up visits as told by your health care provider. This is important.  Contact a health care provider if you:  · Feel palpitations.  Get help right away if you:  · Have chest pain.  · Have shortness of breath.  · Have sweating for no reason.  · Have nausea and vomiting.  · Become light-headed or you faint.  Summary  · A premature ventricular contraction (PVC) is a common kind of irregular heartbeat (arrhythmia).  · In most cases, these contractions come and go and do not require treatment.  · You may need to wear an ambulatory cardiac monitor. This records your heartbeats for 24 hours or more.  · Treatment depends on any underlying conditions, the type of PVCs that you are having, and how much the symptoms are interfering with your daily life.  This information is not intended to replace advice given to you by your health care provider. Make sure you discuss any questions you have with your health care provider.  Document Released: 08/04/2005 Document Revised: 08/02/2019 Document Reviewed: 02/05/2019  [employer] Patient Education © 2019 [employer]

## 2019-11-11 NOTE — PROGRESS NOTES
"Chief Complaint   Patient presents with   • Irregular Heart Beat   • Follow-up       Subjective     Zachary Zayas is a 74 y.o. male being seen for a follow up appointment today regarding irregular heart rate. 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  He has a temporary clearance to substitute  for 3 months. He had seen a cardiolgist several years ago, but [facility] We do not have records for review, and he believes it was > 5 years ago. He denies Chest pain, heart palpitations, SOA, dizziness, or near syncope. He reports \"Dr. Pierre told me I had an irregular hear rate\" as well and \"just checked it every year.\" He also reports a FH       History of Present Illness     Allergies   Allergen Reactions   • Msm [Methylsulfonylmethane] Other (See Comments)     Raises BP   • Influenza Vaccines Other (See Comments)     Declines due to reaction \"3 years in a row.\"    • Lactose Intolerance (Gi) GI Intolerance         Current Outpatient Medications:   •  acetaminophen (TYLENOL) 500 MG tablet, Take 500 mg by mouth Every 6 (Six) Hours As Needed for Mild Pain ., Disp: , Rfl:   •  aspirin 325 MG tablet, Take 325 mg by mouth As Needed for Mild Pain ., Disp: , Rfl:   •  Calcium Carb-Cholecalciferol (CALCIUM 600 + D PO), Take  by mouth., Disp: , Rfl:   •  cetirizine (zyrTEC) 10 MG tablet, Take 5 mg by mouth As Needed for Allergies., Disp: , Rfl:   •  GLUCOSAMINE CHONDROITIN COMPLX PO, Take  by mouth., Disp: , Rfl:   •  hypromellose (ARTIFICIAL TEARS) 0.4 % solution, 1 drop As Needed for Dry Eyes., Disp: , Rfl:   •  ketotifen ( KETOTIFEN FUMARATE) 0.025 % ophthalmic solution, 1 drop 2 (Two) Times a Day., Disp: , Rfl:   •  Lysine HCl (L-LYSINE) 500 MG tablet tablet, Take  by mouth daily., Disp: , Rfl:   •  Misc Natural Products (SAW PALMETTO COMPLEX EX ST PO), Take  by mouth., Disp: , Rfl:   •  PATIENT SUPPLIED ALLERGY INJECTION, Inject  under the skin into the " appropriate area as directed 1 (One) Time., Disp: , Rfl:   •  Pyridoxine HCl (VITAMIN B6 PO), Take  by mouth., Disp: , Rfl:   •  tadalafil (CIALIS) 10 MG tablet, Take 10 mg by mouth daily as needed for erectile dysfunction., Disp: , Rfl:     The following portions of the patient's history were reviewed and updated as appropriate: allergies, current medications, past family history, past medical history, past social history, past surgical history and problem list.    Review of Systems   Constitutional: Negative.  Negative for fatigue and fever.   HENT: Negative.  Negative for congestion.    Eyes: Negative.    Respiratory: Negative.  Negative for shortness of breath, wheezing and stridor.    Cardiovascular: Negative.  Negative for chest pain, palpitations and leg swelling.   Gastrointestinal: Negative.  Negative for abdominal distention and abdominal pain.   Endocrine: Negative.  Negative for polyuria.   Genitourinary: Negative.    Musculoskeletal: Negative.  Negative for arthralgias, back pain, gait problem, joint swelling, myalgias and neck pain.   Skin: Negative.    Allergic/Immunologic: Negative.    Neurological: Negative.  Negative for dizziness, tremors, syncope, facial asymmetry, weakness and light-headedness.   Hematological: Negative.  Negative for adenopathy. Does not bruise/bleed easily.   Psychiatric/Behavioral: Negative.  Negative for agitation and behavioral problems.       Assessment     Physical Exam   Constitutional: He is oriented to person, place, and time. He appears well-developed and well-nourished.   HENT:   Head: Normocephalic.   Mouth/Throat: No oropharyngeal exudate.   Eyes: Pupils are equal, round, and reactive to light.   Neck: Neck supple. No thyromegaly present.   Cardiovascular: An irregularly irregular rhythm present.   No murmur heard.  Pulmonary/Chest: Effort normal and breath sounds normal. No stridor. No respiratory distress.   Musculoskeletal: He exhibits no edema.   Neurological:  He is alert and oriented to person, place, and time. No cranial nerve deficit. Coordination normal.   Skin: Skin is warm.   Psychiatric: He has a normal mood and affect. His behavior is normal.   Vitals reviewed.      Plan     His fasting labs were reviewed with the patient from last week.     Zachary was seen today for irregular heart beat and follow-up.    Diagnoses and all orders for this visit:    Sinus arrhythmia  -     Holter monitor - 24 hour; Future  -     ECG 12 Lead  -     Comprehensive Metabolic Panel  -     CBC & Differential  -     T4 & TSH ([facility])  -     Ambulatory Referral to Cardiology    Premature ventricular contractions (PVCs) (VPCs)  -     Holter monitor - 24 hour; Future  -     ECG 12 Lead  -     Comprehensive Metabolic Panel  -     CBC & Differential  -     T4 & TSH ([facility])  -     Ambulatory Referral to Cardiology        ECG today as compared to previous ECGs is showing both SA and frequent PVCs. In previous ECGs from 2015 he had infrequent PVCs with underlying NSR. Discussed PVCs with him, and AV materials given. He will need a Holter and cardio referral.     [facility], but [facility] are notoriously slow in medical records, and patient cannot give me an accurate timeline on last cardio eval.    Follow up with cardio

## 2019-11-11 NOTE — PROGRESS NOTES
Procedure     ECG 12 Lead  Date/Time: 11/11/2019 8:52 AM  Performed by: Marianne Wasserman APRN  Authorized by: Marianne Wasserman APRN   Comparison: compared with previous ECG from 11/4/2019  Comparison to previous ECG: SA with PVCs  Rhythm: sinus arrhythmia  Ectopy: unifocal PVCs  Rate: normal  BPM: 74

## 2019-11-12 LAB
ALBUMIN SERPL-MCNC: 4.2 G/DL (ref 3.5–4.8)
ALBUMIN/GLOB SERPL: 1.9 {RATIO} (ref 1.2–2.2)
ALP SERPL-CCNC: 68 IU/L (ref 39–117)
ALT SERPL-CCNC: 17 IU/L (ref 0–44)
AST SERPL-CCNC: 24 IU/L (ref 0–40)
BASOPHILS # BLD AUTO: 0 X10E3/UL (ref 0–0.2)
BASOPHILS NFR BLD AUTO: 1 %
BILIRUB SERPL-MCNC: 0.6 MG/DL (ref 0–1.2)
BUN SERPL-MCNC: 15 MG/DL (ref 8–27)
BUN/CREAT SERPL: 19 (ref 10–24)
CALCIUM SERPL-MCNC: 9.3 MG/DL (ref 8.6–10.2)
CHLORIDE SERPL-SCNC: 108 MMOL/L (ref 96–106)
CO2 SERPL-SCNC: 23 MMOL/L (ref 20–29)
CREAT SERPL-MCNC: 0.81 MG/DL (ref 0.76–1.27)
EOSINOPHIL # BLD AUTO: 0.2 X10E3/UL (ref 0–0.4)
EOSINOPHIL NFR BLD AUTO: 2 %
ERYTHROCYTE [DISTWIDTH] IN BLOOD BY AUTOMATED COUNT: 13.4 % (ref 12.3–15.4)
GLOBULIN SER CALC-MCNC: 2.2 G/DL (ref 1.5–4.5)
GLUCOSE SERPL-MCNC: 101 MG/DL (ref 65–99)
HCT VFR BLD AUTO: 41.8 % (ref 37.5–51)
HGB BLD-MCNC: 14.1 G/DL (ref 13–17.7)
IMM GRANULOCYTES # BLD AUTO: 0.1 X10E3/UL (ref 0–0.1)
IMM GRANULOCYTES NFR BLD AUTO: 1 %
LYMPHOCYTES # BLD AUTO: 1.2 X10E3/UL (ref 0.7–3.1)
LYMPHOCYTES NFR BLD AUTO: 18 %
MCH RBC QN AUTO: 29.4 PG (ref 26.6–33)
MCHC RBC AUTO-ENTMCNC: 33.7 G/DL (ref 31.5–35.7)
MCV RBC AUTO: 87 FL (ref 79–97)
MONOCYTES # BLD AUTO: 0.6 X10E3/UL (ref 0.1–0.9)
MONOCYTES NFR BLD AUTO: 8 %
NEUTROPHILS # BLD AUTO: 4.7 X10E3/UL (ref 1.4–7)
NEUTROPHILS NFR BLD AUTO: 70 %
PLATELET # BLD AUTO: 161 X10E3/UL (ref 150–450)
POTASSIUM SERPL-SCNC: 4.6 MMOL/L (ref 3.5–5.2)
PROT SERPL-MCNC: 6.4 G/DL (ref 6–8.5)
RBC # BLD AUTO: 4.79 X10E6/UL (ref 4.14–5.8)
SODIUM SERPL-SCNC: 145 MMOL/L (ref 134–144)
T4 SERPL-MCNC: 5.3 UG/DL (ref 4.5–12)
TSH SERPL DL<=0.005 MIU/L-ACNC: 2.95 UIU/ML (ref 0.45–4.5)
WBC # BLD AUTO: 6.6 X10E3/UL (ref 3.4–10.8)

## 2019-11-13 PROCEDURE — 93227 XTRNL ECG REC<48 HR R&I: CPT | Performed by: INTERNAL MEDICINE

## 2019-12-18 ENCOUNTER — OFFICE VISIT (OUTPATIENT)
Dept: CARDIOLOGY | Facility: CLINIC | Age: 74
End: 2019-12-18

## 2019-12-18 VITALS
HEART RATE: 76 BPM | SYSTOLIC BLOOD PRESSURE: 130 MMHG | DIASTOLIC BLOOD PRESSURE: 82 MMHG | WEIGHT: 174 LBS | HEIGHT: 69 IN | BODY MASS INDEX: 25.77 KG/M2

## 2019-12-18 DIAGNOSIS — I49.3 FREQUENT PVCS: Primary | ICD-10-CM

## 2019-12-18 DIAGNOSIS — R94.31 ABNORMAL ECG: ICD-10-CM

## 2019-12-18 DIAGNOSIS — R07.89 OTHER CHEST PAIN: ICD-10-CM

## 2019-12-18 PROCEDURE — 99204 OFFICE O/P NEW MOD 45 MIN: CPT | Performed by: INTERNAL MEDICINE

## 2019-12-18 NOTE — PROGRESS NOTES
Subjective:     Encounter Date:12/18/19      Patient ID: Zachary Zayas is a 74 y.o. male.    Chief Complaint: PVCs  History of Present Illness    Dear Marianne,    I had the pleasure of seeing this patient in the office today for initial evaluation and consultation.  I appreciate that you sent him in to see us.  They come in today to be seen for frequent PVCs.    Patient was recently in to see you.  Was noted on physical exam to have some irregularity.  EKG showed frequent PVCs.  We then ordered a Holter monitor.  This showed frequent PVCs, 22.5% of the total.    Patient is unaware of the PVCs.  He does not feel any palpitations.  No sensation of tachycardia or irregularity.  He denies any dizziness, lightheadedness, presyncope or syncope.  He rarely has some epigastric discomfort which he thinks is GERD.  He does do exercise on a regular basis and he has not felt any symptoms then.  He denies any shortness of breath with activity or at rest.  No orthopnea or PND.  He has not experienced any unusual fatigue.    This patient has no known cardiac history.  This patient has no history of coronary artery disease, congestive heart failure, rheumatic fever, rheumatic heart disease, congenital heart disease or heart murmur.  This patient has never required invasive cardiovascular evaluation.    The following portions of the patient's history were reviewed and updated as appropriate: allergies, current medications, past family history, past medical history, past social history, past surgical history and problem list.    Past Medical History:   Diagnosis Date   • Arthritis    • Asthma    • Colon polyps    • ED (erectile dysfunction)    • Thyroid nodule        Past Surgical History:   Procedure Laterality Date   • COLONOSCOPY N/A 3/8/2019    Procedure: COLONOSCOPY with polypectomy;  Surgeon: Fred Fang MD;  [facility];  Service: Gastroenterology   • HERNIA REPAIR     • NOSE SURGERY     • SHOULDER  LIGAMENT REPAIR     • TONSILLECTOMY     • TOTAL HIP ARTHROPLASTY Right 2000       Social History     Socioeconomic History   • Marital status:      Spouse name: Not on file   • Number of children: Not on file   • Years of education: Not on file   • Highest education level: Not on file   Occupational History   • Occupation:    Tobacco Use   • Smoking status: Never Smoker   • Smokeless tobacco: Never Used   Substance and Sexual Activity   • Alcohol use: No   • Drug use: No   • Sexual activity: Defer   Social History Narrative    He is retired from [employer]. 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     Wilda [employer]        Noonsmoker,nondrinker Lutheran        He is a  and .        Review of Systems   Constitution: Negative for chills, decreased appetite, fever and night sweats.   HENT: Negative for ear discharge, ear pain, hearing loss, nosebleeds and sore throat.    Eyes: Negative for blurred vision, double vision and pain.   Cardiovascular: Negative for cyanosis.   Respiratory: Negative for hemoptysis and sputum production.    Endocrine: Negative for cold intolerance and heat intolerance.   Hematologic/Lymphatic: Negative for adenopathy.   Skin: Negative for dry skin, itching, nail changes, rash and suspicious lesions.   Musculoskeletal: Negative for arthritis, gout, muscle cramps, muscle weakness, myalgias and neck pain.   Gastrointestinal: Negative for anorexia, bowel incontinence, constipation, diarrhea, dysphagia, hematemesis and jaundice.   Genitourinary: Negative for bladder incontinence, dysuria, flank pain, frequency, hematuria and nocturia.   Neurological: Negative for focal weakness, numbness, paresthesias and seizures.   Psychiatric/Behavioral: Negative for altered mental status, hallucinations, hypervigilance, suicidal ideas and thoughts of violence.   Allergic/Immunologic: Negative for persistent infections.       Procedures       Objective:  "    Vitals:    12/18/19 0917   BP: 130/82   Pulse: 76   Weight: 78.9 kg (174 lb)   Height: 175.3 cm (69\")         Physical Exam   Constitutional: He is oriented to person, place, and time. He appears well-developed and well-nourished. No distress.   HENT:   Head: Normocephalic and atraumatic.   Nose: Nose normal.   Mouth/Throat: Oropharynx is clear and moist.   Eyes: Pupils are equal, round, and reactive to light. Conjunctivae and EOM are normal. Right eye exhibits no discharge. Left eye exhibits no discharge.   Neck: Normal range of motion. Neck supple. No tracheal deviation present. No thyromegaly present.   Cardiovascular: Normal rate, regular rhythm, S1 normal, S2 normal, normal heart sounds and normal pulses. Exam reveals no S3.   Pulmonary/Chest: Effort normal and breath sounds normal. No stridor. No respiratory distress. He exhibits no tenderness.   Abdominal: Soft. Bowel sounds are normal. He exhibits no distension and no mass. There is no tenderness. There is no rebound and no guarding.   Musculoskeletal: Normal range of motion. He exhibits no tenderness or deformity.   Lymphadenopathy:     He has no cervical adenopathy.   Neurological: He is alert and oriented to person, place, and time. He has normal reflexes.   Skin: Skin is warm and dry. No rash noted. He is not diaphoretic. No erythema.   Psychiatric: He has a normal mood and affect. Thought content normal.       Lab Review:             Performed        Assessment:          Diagnosis Plan   1. Frequent PVCs  Adult Stress Echo W/ Cont or Stress Agent if Necessary Per Protocol   2. Abnormal ECG  Adult Stress Echo W/ Cont or Stress Agent if Necessary Per Protocol   3. Other chest pain  Adult Stress Echo W/ Cont or Stress Agent if Necessary Per Protocol          Plan:       This patient has very frequent PVCs.  EKG in your office shows nonspecific ST segment changes and frequent PVCs.  He does have rare epigastric discomfort, although this is not " associated with activity.  Given the above, we will arrange for stress echocardiogram to be performed.  Further evaluation and treatment will then be predicated on the results.  Thank you very much for allowing us to participate in the care of this pleasant patient.  Please don't hesitate to call if I can be of assistance in any way.      Current Outpatient Medications:   •  acetaminophen (TYLENOL) 500 MG tablet, Take 500 mg by mouth Every 6 (Six) Hours As Needed for Mild Pain ., Disp: , Rfl:   •  aspirin 325 MG tablet, Take 325 mg by mouth As Needed for Mild Pain ., Disp: , Rfl:   •  Calcium Carb-Cholecalciferol (CALCIUM 600 + D PO), Take 1 tablet by mouth Daily., Disp: , Rfl:   •  cetirizine (zyrTEC) 10 MG tablet, Take 5 mg by mouth As Needed for Allergies., Disp: , Rfl:   •  GLUCOSAMINE CHONDROITIN COMPLX PO, Take 2 tablets by mouth Daily., Disp: , Rfl:   •  hypromellose (ARTIFICIAL TEARS) 0.4 % solution, 1 drop As Needed for Dry Eyes., Disp: , Rfl:   •  ketotifen (KP KETOTIFEN FUMARATE) 0.025 % ophthalmic solution, 1 drop 2 (Two) Times a Day., Disp: , Rfl:   •  Lysine HCl (L-LYSINE) 500 MG tablet tablet, Take 500 mg by mouth Daily., Disp: , Rfl:   •  Misc Natural Products (SAW PALMETTO COMPLEX EX ST PO), Take 2 tablets by mouth Daily., Disp: , Rfl:   •  PATIENT SUPPLIED ALLERGY INJECTION, Inject  under the skin into the appropriate area as directed 1 (One) Time., Disp: , Rfl:   •  Pyridoxine HCl (VITAMIN B6 PO), Take 1 tablet by mouth Daily., Disp: , Rfl:   •  tadalafil (CIALIS) 10 MG tablet, Take 10 mg by mouth daily as needed for erectile dysfunction., Disp: , Rfl:          No follow-ups on file.

## 2020-01-08 ENCOUNTER — TELEPHONE (OUTPATIENT)
Dept: CARDIOLOGY | Facility: CLINIC | Age: 75
End: 2020-01-08

## 2020-01-08 ENCOUNTER — HOSPITAL ENCOUNTER (OUTPATIENT)
Dept: CARDIOLOGY | Facility: HOSPITAL | Age: 75
Discharge: HOME | End: 2020-01-08
Admitting: INTERNAL MEDICINE

## 2020-01-08 VITALS
SYSTOLIC BLOOD PRESSURE: 150 MMHG | DIASTOLIC BLOOD PRESSURE: 84 MMHG | OXYGEN SATURATION: 97 % | HEIGHT: 69 IN | HEART RATE: 80 BPM | WEIGHT: 174 LBS | BODY MASS INDEX: 25.77 KG/M2

## 2020-01-08 DIAGNOSIS — I49.3 FREQUENT PVCS: ICD-10-CM

## 2020-01-08 DIAGNOSIS — R07.89 OTHER CHEST PAIN: ICD-10-CM

## 2020-01-08 DIAGNOSIS — R94.31 ABNORMAL ECG: ICD-10-CM

## 2020-01-08 LAB
BH CV ECHO MEAS - ACS: 2.3 CM
BH CV ECHO MEAS - AI DEC SLOPE: 235 CM/SEC^2
BH CV ECHO MEAS - AI MAX PG: 74.3 MMHG
BH CV ECHO MEAS - AI MAX VEL: 431 CM/SEC
BH CV ECHO MEAS - AI P1/2T: 537.2 MSEC
BH CV ECHO MEAS - AO MAX PG (FULL): 2.6 MMHG
BH CV ECHO MEAS - AO MAX PG: 6.6 MMHG
BH CV ECHO MEAS - AO MEAN PG (FULL): 2 MMHG
BH CV ECHO MEAS - AO MEAN PG: 4 MMHG
BH CV ECHO MEAS - AO ROOT AREA (BSA CORRECTED): 1.5
BH CV ECHO MEAS - AO ROOT AREA: 7.1 CM^2
BH CV ECHO MEAS - AO ROOT DIAM: 3 CM
BH CV ECHO MEAS - AO V2 MAX: 128 CM/SEC
BH CV ECHO MEAS - AO V2 MEAN: 88.7 CM/SEC
BH CV ECHO MEAS - AO V2 VTI: 30.2 CM
BH CV ECHO MEAS - ASC AORTA: 3.4 CM
BH CV ECHO MEAS - AVA(I,A): 2.7 CM^2
BH CV ECHO MEAS - AVA(I,D): 2.7 CM^2
BH CV ECHO MEAS - AVA(V,A): 3.2 CM^2
BH CV ECHO MEAS - AVA(V,D): 3.2 CM^2
BH CV ECHO MEAS - BSA(HAYCOCK): 2 M^2
BH CV ECHO MEAS - BSA: 1.9 M^2
BH CV ECHO MEAS - BZI_BMI: 25.7 KILOGRAMS/M^2
BH CV ECHO MEAS - BZI_METRIC_HEIGHT: 175.3 CM
BH CV ECHO MEAS - BZI_METRIC_WEIGHT: 78.9 KG
BH CV ECHO MEAS - EDV(CUBED): 143.9 ML
BH CV ECHO MEAS - EDV(MOD-SP2): 125 ML
BH CV ECHO MEAS - EDV(MOD-SP4): 176 ML
BH CV ECHO MEAS - EDV(TEICH): 131.8 ML
BH CV ECHO MEAS - EF(CUBED): 56.7 %
BH CV ECHO MEAS - EF(MOD-BP): 41 %
BH CV ECHO MEAS - EF(MOD-SP2): 40.5 %
BH CV ECHO MEAS - EF(MOD-SP4): 40.9 %
BH CV ECHO MEAS - EF(TEICH): 48 %
BH CV ECHO MEAS - ESV(CUBED): 62.3 ML
BH CV ECHO MEAS - ESV(MOD-SP2): 74.4 ML
BH CV ECHO MEAS - ESV(MOD-SP4): 104 ML
BH CV ECHO MEAS - ESV(TEICH): 68.6 ML
BH CV ECHO MEAS - FS: 24.3 %
BH CV ECHO MEAS - IVS/LVPW: 1.1
BH CV ECHO MEAS - IVSD: 1.2 CM
BH CV ECHO MEAS - LAT PEAK E' VEL: 8 CM/SEC
BH CV ECHO MEAS - LV DIASTOLIC VOL/BSA (35-75): 90.4 ML/M^2
BH CV ECHO MEAS - LV MASS(C)D: 237.5 GRAMS
BH CV ECHO MEAS - LV MASS(C)DI: 122 GRAMS/M^2
BH CV ECHO MEAS - LV MAX PG: 3.9 MMHG
BH CV ECHO MEAS - LV MEAN PG: 2 MMHG
BH CV ECHO MEAS - LV SYSTOLIC VOL/BSA (12-30): 53.4 ML/M^2
BH CV ECHO MEAS - LV V1 MAX: 99 CM/SEC
BH CV ECHO MEAS - LV V1 MEAN: 59.3 CM/SEC
BH CV ECHO MEAS - LV V1 VTI: 19.3 CM
BH CV ECHO MEAS - LVIDD: 5.2 CM
BH CV ECHO MEAS - LVIDS: 4 CM
BH CV ECHO MEAS - LVLD AP2: 8.7 CM
BH CV ECHO MEAS - LVLD AP4: 8.9 CM
BH CV ECHO MEAS - LVLS AP2: 8.2 CM
BH CV ECHO MEAS - LVLS AP4: 8.2 CM
BH CV ECHO MEAS - LVOT AREA (M): 4.2 CM^2
BH CV ECHO MEAS - LVOT AREA: 4.2 CM^2
BH CV ECHO MEAS - LVOT DIAM: 2.3 CM
BH CV ECHO MEAS - LVPWD: 1.1 CM
BH CV ECHO MEAS - MED PEAK E' VEL: 10 CM/SEC
BH CV ECHO MEAS - MV A DUR: 0.14 SEC
BH CV ECHO MEAS - MV A MAX VEL: 53.8 CM/SEC
BH CV ECHO MEAS - MV DEC SLOPE: 377 CM/SEC^2
BH CV ECHO MEAS - MV DEC TIME: 401 SEC
BH CV ECHO MEAS - MV E MAX VEL: 57.7 CM/SEC
BH CV ECHO MEAS - MV E/A: 1.1
BH CV ECHO MEAS - MV MAX PG: 3.3 MMHG
BH CV ECHO MEAS - MV MEAN PG: 1 MMHG
BH CV ECHO MEAS - MV P1/2T MAX VEL: 77.6 CM/SEC
BH CV ECHO MEAS - MV P1/2T: 60.3 MSEC
BH CV ECHO MEAS - MV V2 MAX: 90.5 CM/SEC
BH CV ECHO MEAS - MV V2 MEAN: 53.5 CM/SEC
BH CV ECHO MEAS - MV V2 VTI: 22.6 CM
BH CV ECHO MEAS - MVA P1/2T LCG: 2.8 CM^2
BH CV ECHO MEAS - MVA(P1/2T): 3.6 CM^2
BH CV ECHO MEAS - MVA(VTI): 3.5 CM^2
BH CV ECHO MEAS - PA ACC TIME: 0.07 SEC
BH CV ECHO MEAS - PA MAX PG (FULL): 5.8 MMHG
BH CV ECHO MEAS - PA MAX PG: 6.7 MMHG
BH CV ECHO MEAS - PA PR(ACCEL): 49.3 MMHG
BH CV ECHO MEAS - PA V2 MAX: 129 CM/SEC
BH CV ECHO MEAS - PULM A REVS DUR: 0.1 SEC
BH CV ECHO MEAS - PULM A REVS VEL: 30.1 CM/SEC
BH CV ECHO MEAS - PULM DIAS VEL: 66.7 CM/SEC
BH CV ECHO MEAS - PULM S/D: 1.2
BH CV ECHO MEAS - PULM SYS VEL: 83 CM/SEC
BH CV ECHO MEAS - PVA(V,A): 0.99 CM^2
BH CV ECHO MEAS - PVA(V,D): 0.99 CM^2
BH CV ECHO MEAS - QP/QS: 0.31
BH CV ECHO MEAS - RAP SYSTOLE: 3 MMHG
BH CV ECHO MEAS - RV MAX PG: 0.81 MMHG
BH CV ECHO MEAS - RV MEAN PG: 0 MMHG
BH CV ECHO MEAS - RV V1 MAX: 45.1 CM/SEC
BH CV ECHO MEAS - RV V1 MEAN: 28.9 CM/SEC
BH CV ECHO MEAS - RV V1 VTI: 8.7 CM
BH CV ECHO MEAS - RVOT AREA: 2.8 CM^2
BH CV ECHO MEAS - RVOT DIAM: 1.9 CM
BH CV ECHO MEAS - RVSP: 32 MMHG
BH CV ECHO MEAS - SI(AO): 109.6 ML/M^2
BH CV ECHO MEAS - SI(CUBED): 41.9 ML/M^2
BH CV ECHO MEAS - SI(LVOT): 41.2 ML/M^2
BH CV ECHO MEAS - SI(MOD-SP2): 26 ML/M^2
BH CV ECHO MEAS - SI(MOD-SP4): 37 ML/M^2
BH CV ECHO MEAS - SI(TEICH): 32.5 ML/M^2
BH CV ECHO MEAS - SV(AO): 213.5 ML
BH CV ECHO MEAS - SV(CUBED): 81.5 ML
BH CV ECHO MEAS - SV(LVOT): 80.2 ML
BH CV ECHO MEAS - SV(MOD-SP2): 50.6 ML
BH CV ECHO MEAS - SV(MOD-SP4): 72 ML
BH CV ECHO MEAS - SV(RVOT): 24.7 ML
BH CV ECHO MEAS - SV(TEICH): 63.3 ML
BH CV ECHO MEAS - TAPSE (>1.6): 2.2 CM
BH CV ECHO MEAS - TR MAX VEL: 288 CM/SEC
BH CV ECHO MEASUREMENTS AVERAGE E/E' RATIO: 6.41
BH CV STRESS BP STAGE 1: NORMAL
BH CV STRESS BP STAGE 2: NORMAL
BH CV STRESS BP STAGE 3: NORMAL
BH CV STRESS DURATION MIN STAGE 1: 3
BH CV STRESS DURATION MIN STAGE 2: 3
BH CV STRESS DURATION MIN STAGE 3: 1
BH CV STRESS DURATION SEC STAGE 1: 0
BH CV STRESS DURATION SEC STAGE 2: 0
BH CV STRESS DURATION SEC STAGE 3: 8
BH CV STRESS GRADE STAGE 1: 10
BH CV STRESS GRADE STAGE 2: 12
BH CV STRESS GRADE STAGE 3: 14
BH CV STRESS HR STAGE 1: 99
BH CV STRESS HR STAGE 2: 124
BH CV STRESS HR STAGE 3: 133
BH CV STRESS METS STAGE 1: 5
BH CV STRESS METS STAGE 2: 7.5
BH CV STRESS METS STAGE 3: 10
BH CV STRESS PROTOCOL 1: NORMAL
BH CV STRESS RECOVERY BP: NORMAL MMHG
BH CV STRESS RECOVERY HR: 82 BPM
BH CV STRESS SPEED STAGE 1: 1.7
BH CV STRESS SPEED STAGE 2: 2.5
BH CV STRESS SPEED STAGE 3: 3.4
BH CV STRESS STAGE 1: 1
BH CV STRESS STAGE 2: 2
BH CV STRESS STAGE 3: 3
BH CV VAS BP LEFT ARM: NORMAL MMHG
BH CV XLRA - RV BASE: 4 CM
BH CV XLRA - TDI S': 14 CM/SEC
LEFT ATRIUM VOLUME INDEX: 27 ML/M2
MAXIMAL PREDICTED HEART RATE: 148 BPM
PERCENT MAX PREDICTED HR: 94.59 %
STRESS BASELINE BP: NORMAL MMHG
STRESS BASELINE HR: 80 BPM
STRESS O2 SAT REST: 97 %
STRESS PERCENT HR: 111 %
STRESS POST ESTIMATED WORKLOAD: 8.5 METS
STRESS POST EXERCISE DUR MIN: 7 MIN
STRESS POST EXERCISE DUR SEC: 8 SEC
STRESS POST O2 SAT PEAK: 97 %
STRESS POST PEAK BP: NORMAL MMHG
STRESS POST PEAK HR: 140 BPM
STRESS TARGET HR: 126 BPM

## 2020-01-08 PROCEDURE — 93016 CV STRESS TEST SUPVJ ONLY: CPT | Performed by: INTERNAL MEDICINE

## 2020-01-08 PROCEDURE — 93017 CV STRESS TEST TRACING ONLY: CPT

## 2020-01-08 PROCEDURE — 25010000002 PERFLUTREN (DEFINITY) 8.476 MG IN SODIUM CHLORIDE 0.9 % 10 ML INJECTION: Performed by: INTERNAL MEDICINE

## 2020-01-08 PROCEDURE — 93325 DOPPLER ECHO COLOR FLOW MAPG: CPT

## 2020-01-08 PROCEDURE — 93320 DOPPLER ECHO COMPLETE: CPT | Performed by: INTERNAL MEDICINE

## 2020-01-08 PROCEDURE — 93018 CV STRESS TEST I&R ONLY: CPT | Performed by: INTERNAL MEDICINE

## 2020-01-08 PROCEDURE — 93352 ADMIN ECG CONTRAST AGENT: CPT | Performed by: INTERNAL MEDICINE

## 2020-01-08 PROCEDURE — 93350 STRESS TTE ONLY: CPT

## 2020-01-08 PROCEDURE — 93350 STRESS TTE ONLY: CPT | Performed by: INTERNAL MEDICINE

## 2020-01-08 PROCEDURE — 93320 DOPPLER ECHO COMPLETE: CPT

## 2020-01-08 PROCEDURE — 93325 DOPPLER ECHO COLOR FLOW MAPG: CPT | Performed by: INTERNAL MEDICINE

## 2020-01-08 RX ADMIN — PERFLUTREN 6 ML: 6.52 INJECTION, SUSPENSION INTRAVENOUS at 10:00

## 2020-01-08 NOTE — TELEPHONE ENCOUNTER
----- Message from Sher Camacho III, MD sent at 1/8/2020  2:32 PM EST -----  Please arrange for a follow-up appointment so we can go over his stress test

## 2020-01-15 ENCOUNTER — OFFICE VISIT (OUTPATIENT)
Dept: CARDIOLOGY | Facility: CLINIC | Age: 75
End: 2020-01-15

## 2020-01-15 VITALS
SYSTOLIC BLOOD PRESSURE: 124 MMHG | BODY MASS INDEX: 24.91 KG/M2 | DIASTOLIC BLOOD PRESSURE: 80 MMHG | HEART RATE: 88 BPM | WEIGHT: 174 LBS | HEIGHT: 70 IN

## 2020-01-15 DIAGNOSIS — I49.3 PREMATURE VENTRICULAR CONTRACTIONS (PVCS) (VPCS): Chronic | ICD-10-CM

## 2020-01-15 DIAGNOSIS — I49.3 FREQUENT PVCS: Primary | ICD-10-CM

## 2020-01-15 DIAGNOSIS — I42.8 NICM (NONISCHEMIC CARDIOMYOPATHY) (HCC): ICD-10-CM

## 2020-01-15 PROCEDURE — 99214 OFFICE O/P EST MOD 30 MIN: CPT | Performed by: INTERNAL MEDICINE

## 2020-01-15 RX ORDER — CARVEDILOL 3.12 MG/1
3.12 TABLET ORAL 2 TIMES DAILY WITH MEALS
Qty: 60 TABLET | Refills: 11 | Status: SHIPPED | OUTPATIENT
Start: 2020-01-15 | End: 2020-02-18 | Stop reason: HOSPADM

## 2020-01-15 NOTE — PROGRESS NOTES
Subjective:     Encounter Date:01/15/20      Patient ID: Zachary Zayas is a 74 y.o. male.    Chief Complaint: PVCs  History of Present Illness    Dear Marianne,    I had the pleasure of seeing this patient in the office today for f/u.  His stress echocardiogram just showed no ischemia but a decline in ejection fraction of 41%..    Patient was recently in to see you.  Was noted on physical exam to have some irregularity.  EKG showed frequent PVCs.  We then ordered a Holter monitor.  This showed frequent PVCs, 22.5% of the total.    Patient does not feel his palpitations.  Overall he continues to feel fine.  He denies any chest pain or chest discomfort.  No shortness of breath.    This patient has no known cardiac history.  This patient has no history of coronary artery disease, congestive heart failure, rheumatic fever, rheumatic heart disease, congenital heart disease or heart murmur.  This patient has never required invasive cardiovascular evaluation.    The following portions of the patient's history were reviewed and updated as appropriate: allergies, current medications, past family history, past medical history, past social history, past surgical history and problem list.    Past Medical History:   Diagnosis Date   • Arthritis    • Asthma    • Colon polyps    • ED (erectile dysfunction)    • Thyroid nodule        Past Surgical History:   Procedure Laterality Date   • COLONOSCOPY N/A 3/8/2019    Procedure: COLONOSCOPY with polypectomy;  Surgeon: Fred Fang MD;  Location: [facility];  Service: Gastroenterology   • HERNIA REPAIR     • NOSE SURGERY     • SHOULDER LIGAMENT REPAIR     • TONSILLECTOMY     • TOTAL HIP ARTHROPLASTY Right 2000       Social History     Socioeconomic History   • Marital status:      Spouse name: Not on file   • Number of children: Not on file   • Years of education: Not on file   • Highest education level: Not on file   Occupational History   • Occupation: bus  "   Tobacco Use   • Smoking status: Never Smoker   • Smokeless tobacco: Never Used   Substance and Sexual Activity   • Alcohol use: No   • Drug use: No   • Sexual activity: Defer   Social History Narrative    He is retired from [employer]. 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     Wilda [employer]        Noonsmoker,nondrinker Congregational        He is a  and .        Review of Systems   Constitution: Negative for chills, decreased appetite, fever and night sweats.   HENT: Negative for ear discharge, ear pain, hearing loss, nosebleeds and sore throat.    Eyes: Negative for blurred vision, double vision and pain.   Cardiovascular: Negative for cyanosis.   Respiratory: Negative for hemoptysis and sputum production.    Endocrine: Negative for cold intolerance and heat intolerance.   Hematologic/Lymphatic: Negative for adenopathy.   Skin: Negative for dry skin, itching, nail changes, rash and suspicious lesions.   Musculoskeletal: Negative for arthritis, gout, muscle cramps, muscle weakness, myalgias and neck pain.   Gastrointestinal: Negative for anorexia, bowel incontinence, constipation, diarrhea, dysphagia, hematemesis and jaundice.   Genitourinary: Negative for bladder incontinence, dysuria, flank pain, frequency, hematuria and nocturia.   Neurological: Negative for focal weakness, numbness, paresthesias and seizures.   Psychiatric/Behavioral: Negative for altered mental status, hallucinations, hypervigilance, suicidal ideas and thoughts of violence.   Allergic/Immunologic: Negative for persistent infections.       Procedures       Objective:     Vitals:    01/15/20 1000   BP: 124/80   Pulse: 88   Weight: 78.9 kg (174 lb)   Height: 177.8 cm (70\")         Physical Exam   Constitutional: He is oriented to person, place, and time. He appears well-developed and well-nourished. No distress.   HENT:   Head: Normocephalic and atraumatic.   Nose: Nose normal.   Mouth/Throat: Oropharynx is " clear and moist.   Eyes: Pupils are equal, round, and reactive to light. Conjunctivae and EOM are normal. Right eye exhibits no discharge. Left eye exhibits no discharge.   Neck: Normal range of motion. Neck supple. No tracheal deviation present. No thyromegaly present.   Cardiovascular: Normal rate, regular rhythm, S1 normal, S2 normal, normal heart sounds and normal pulses. Exam reveals no S3.   Pulmonary/Chest: Effort normal and breath sounds normal. No stridor. No respiratory distress. He exhibits no tenderness.   Abdominal: Soft. Bowel sounds are normal. He exhibits no distension and no mass. There is no tenderness. There is no rebound and no guarding.   Musculoskeletal: Normal range of motion. He exhibits no tenderness or deformity.   Lymphadenopathy:     He has no cervical adenopathy.   Neurological: He is alert and oriented to person, place, and time. He has normal reflexes.   Skin: Skin is warm and dry. No rash noted. He is not diaphoretic. No erythema.   Psychiatric: He has a normal mood and affect. Thought content normal.       Lab Review:             Performed        Assessment:         No diagnosis found.       Plan:       1.  Nonischemic cardiomyopathy-ejection fraction 41% on the stress echocardiogram.  No evidence of ischemia and no symptoms to suggest same.  We will start carvedilol 3.125 mg twice daily and refer him to electrophysiology given his heavy PVC burden  2.  Frequent PVCs- 22.5% of total beats, refer to electrophysiology.  We will follow-up with the initiation of beta-blockade.    Thank you very much for allowing us to participate in the care of this pleasant patient.  Please don't hesitate to call if I can be of assistance in any way.      Current Outpatient Medications:   •  acetaminophen (TYLENOL) 500 MG tablet, Take 500 mg by mouth Every 6 (Six) Hours As Needed for Mild Pain ., Disp: , Rfl:   •  amoxicillin (AMOXIL) 875 MG tablet, Take 1 tablet by mouth 2 (Two) Times a Day for 10  days., Disp: 20 tablet, Rfl: 0  •  Calcium Carb-Cholecalciferol (CALCIUM 600 + D PO), Take 1 tablet by mouth Daily., Disp: , Rfl:   •  cetirizine (zyrTEC) 10 MG tablet, Take 5 mg by mouth As Needed for Allergies., Disp: , Rfl:   •  GLUCOSAMINE CHONDROITIN COMPLX PO, Take 2 tablets by mouth Daily., Disp: , Rfl:   •  hypromellose (ARTIFICIAL TEARS) 0.4 % solution, 1 drop As Needed for Dry Eyes., Disp: , Rfl:   •  ketotifen (KP KETOTIFEN FUMARATE) 0.025 % ophthalmic solution, 1 drop 2 (Two) Times a Day., Disp: , Rfl:   •  Lysine HCl (L-LYSINE) 500 MG tablet tablet, Take 500 mg by mouth Daily., Disp: , Rfl:   •  Misc Natural Products (SAW PALMETTO COMPLEX EX ST PO), Take 2 tablets by mouth Daily., Disp: , Rfl:   •  PATIENT SUPPLIED ALLERGY INJECTION, Inject  under the skin into the appropriate area as directed 1 (One) Time., Disp: , Rfl:   •  Pyridoxine HCl (VITAMIN B6 PO), Take 1 tablet by mouth Daily., Disp: , Rfl:   •  tadalafil (CIALIS) 10 MG tablet, Take 10 mg by mouth daily as needed for erectile dysfunction., Disp: , Rfl:          No follow-ups on file.

## 2020-01-22 ENCOUNTER — TELEPHONE (OUTPATIENT)
Dept: CARDIOLOGY | Facility: CLINIC | Age: 75
End: 2020-01-22

## 2020-01-22 NOTE — TELEPHONE ENCOUNTER
"Certainly willing to help- what does he need this note to say? 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

## 2020-01-22 NOTE — TELEPHONE ENCOUNTER
Pt called. 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 He said he just needs something for [employer]ism works till his appointment with Dr. Blanca. You saw him on 1/15/2020. He has an appointment with Dr. Blanca on 1/30/2020.

## 2020-01-23 NOTE — TELEPHONE ENCOUNTER
I spoke to the pt. 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    He needs the note to say     Ok to drive temporary until he see  on 1/30/20 pending further follow up.    PT #: [phone]    Thanks Sandy

## 2020-01-24 NOTE — TELEPHONE ENCOUNTER
Pt is aware that the letter is ready for  on Monday at the [facility].      THanks Sandy

## 2020-01-30 ENCOUNTER — OFFICE VISIT (OUTPATIENT)
Dept: CARDIOLOGY | Facility: CLINIC | Age: 75
End: 2020-01-30

## 2020-01-30 VITALS
HEART RATE: 74 BPM | HEIGHT: 71 IN | WEIGHT: 172 LBS | BODY MASS INDEX: 24.08 KG/M2 | DIASTOLIC BLOOD PRESSURE: 78 MMHG | SYSTOLIC BLOOD PRESSURE: 126 MMHG

## 2020-01-30 DIAGNOSIS — I49.3 PVC (PREMATURE VENTRICULAR CONTRACTION): Primary | ICD-10-CM

## 2020-01-30 PROCEDURE — 99204 OFFICE O/P NEW MOD 45 MIN: CPT | Performed by: INTERNAL MEDICINE

## 2020-01-30 PROCEDURE — 93000 ELECTROCARDIOGRAM COMPLETE: CPT | Performed by: INTERNAL MEDICINE

## 2020-01-31 ENCOUNTER — TRANSCRIBE ORDERS (OUTPATIENT)
Dept: CARDIOLOGY | Facility: CLINIC | Age: 75
End: 2020-01-31

## 2020-01-31 DIAGNOSIS — Z13.6 SCREENING FOR CARDIOVASCULAR CONDITION: Primary | ICD-10-CM

## 2020-01-31 DIAGNOSIS — I49.3 PVC (PREMATURE VENTRICULAR CONTRACTION): ICD-10-CM

## 2020-01-31 DIAGNOSIS — Z01.810 PREPROCEDURAL CARDIOVASCULAR EXAMINATION: ICD-10-CM

## 2020-01-31 NOTE — PROGRESS NOTES
Date of Office Visit: 2020  Encounter Provider: Abisai Blanca MD  Place of Service: [facility]  Patient Name: Zachary Zayas  :1945    Chief Complaint   Patient presents with   • Irregular Heart Beat     New Patient Consult / PVC's JA ref   :     HPI: Zachary Zayas is a 74 y.o. male who presents today for evaluation of premature ventricular contractions.  The patient had an irregular heartbeat noted during recent DOT physical.  He works as a  part-time.  Subsequent to that he had echocardiogram which demonstrated an ejection fraction of 40%.  Stress testing revealed no evidence of ischemia.  PVC frequency decreased somewhat with increasing tachycardia during stress testing.  Patient denies any awareness of palpitations or tachycardia.          Past Medical History:   Diagnosis Date   • Arthritis    • Asthma    • Colon polyps    • ED (erectile dysfunction)    • NICM (nonischemic cardiomyopathy) (CMS/[facility]) 1/15/2020   • PVC's (premature ventricular contractions) 1/15/2020   • Thyroid nodule        Past Surgical History:   Procedure Laterality Date   • COLONOSCOPY N/A 3/8/2019    Procedure: COLONOSCOPY with polypectomy;  Surgeon: Fred Fang MD;  Location: [facility];  Service: Gastroenterology   • HERNIA REPAIR     • NOSE SURGERY     • SHOULDER LIGAMENT REPAIR     • TONSILLECTOMY     • TOTAL HIP ARTHROPLASTY Right        Social History     Socioeconomic History   • Marital status:      Spouse name: Not on file   • Number of children: Not on file   • Years of education: Not on file   • Highest education level: Not on file   Occupational History   • Occupation:    Tobacco Use   • Smoking status: Never Smoker   • Smokeless tobacco: Never Used   Substance and Sexual Activity   • Alcohol use: No   • Drug use: No   • Sexual activity: Defer   Social History Narrative    He is retired from [employer]. 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     Wilda Iniguezt        Noonsmoker,nondrinker Spiritism        He is a  and .        Family History   Problem Relation Age of Onset   • Heart attack Father 43   • Hypertension Father    • Sudden death Father        Review of Systems   Constitution: Negative for malaise/fatigue.   HENT: Negative.    Eyes: Negative.    Cardiovascular: Negative for chest pain, dyspnea on exertion, leg swelling and near-syncope.   Respiratory: Negative for cough and shortness of breath.    Endocrine: Negative.    Hematologic/Lymphatic: Negative.    Skin: Negative.    Musculoskeletal: Negative.    Gastrointestinal: Negative.    Genitourinary: Negative.    Neurological: Negative.  Negative for weakness.   Psychiatric/Behavioral: Negative.    Allergic/Immunologic: Negative.        Allergies   Allergen Reactions   • Msm [Methylsulfonylmethane] Other (See Comments)     Raises BP   • Influenza Vaccines Other (See Comments)     Declines due to reaction \"3 years in a row.\"    • Lactose Intolerance (Gi) GI Intolerance         Current Outpatient Medications:   •  acetaminophen (TYLENOL) 500 MG tablet, Take 500 mg by mouth Every 6 (Six) Hours As Needed for Mild Pain ., Disp: , Rfl:   •  Calcium Carb-Cholecalciferol (CALCIUM 600 + D PO), Take 1 tablet by mouth Daily., Disp: , Rfl:   •  carvedilol (COREG) 3.125 MG tablet, Take 1 tablet by mouth 2 (Two) Times a Day With Meals., Disp: 60 tablet, Rfl: 11  •  cetirizine (zyrTEC) 10 MG tablet, Take 5 mg by mouth As Needed for Allergies., Disp: , Rfl:   •  GLUCOSAMINE CHONDROITIN COMPLX PO, Take 2 tablets by mouth Daily., Disp: , Rfl:   •  hypromellose (ARTIFICIAL TEARS) 0.4 % solution, 1 drop As Needed for Dry Eyes., Disp: , Rfl:   •  Lysine HCl (L-LYSINE) 500 MG tablet tablet, Take 500 mg by mouth Daily., Disp: , Rfl:   •  Misc Natural Products (SAW PALMETTO COMPLEX EX ST PO), Take 2 tablets by mouth Daily., Disp: , Rfl:   •  PATIENT " "SUPPLIED ALLERGY INJECTION, Inject  under the skin into the appropriate area as directed 1 (One) Time., Disp: , Rfl:   •  Pyridoxine HCl (VITAMIN B6 PO), Take 1 tablet by mouth Daily., Disp: , Rfl:   •  tadalafil (CIALIS) 10 MG tablet, Take 10 mg by mouth daily as needed for erectile dysfunction., Disp: , Rfl:   •  ketotifen (KP KETOTIFEN FUMARATE) 0.025 % ophthalmic solution, 1 drop 2 (Two) Times a Day., Disp: , Rfl:       Objective:     Vitals:    01/30/20 1347   BP: 126/78   BP Location: Right arm   Patient Position: Sitting   Cuff Size: Large Adult   Pulse: 74   Weight: 78 kg (172 lb)   Height: 180.3 cm (71\")     Body mass index is 23.99 kg/m².    PHYSICAL EXAM:    Physical Exam   Constitutional: He is oriented to person, place, and time. He appears well-developed and well-nourished. No distress.   HENT:   Head: Normocephalic and atraumatic.   Eyes: Right eye exhibits no discharge. Left eye exhibits no discharge. No scleral icterus.   Neck: No JVD present. No tracheal deviation present.   Cardiovascular: Normal rate, regular rhythm and normal heart sounds. Frequent extrasystoles are present.   Pulmonary/Chest: Effort normal and breath sounds normal.   Abdominal: Soft. Bowel sounds are normal.   Musculoskeletal: He exhibits no edema.   Neurological: He is alert and oriented to person, place, and time.   Skin: Skin is warm and dry. He is not diaphoretic.   Psychiatric: He has a normal mood and affect. His behavior is normal. Judgment and thought content normal.   Nursing note and vitals reviewed.          ECG 12 Lead  Date/Time: 1/30/2020 10:29 AM  Performed by: Abisai Blanca MD  Authorized by: Abisai Blanca MD   Comparison: compared with previous ECG   Rhythm: sinus rhythm  Ectopy: unifocal PVCs and bigeminy    Clinical impression: abnormal EKG          I reviewed his Holter monitor tracings.  He had a predominance of PVCs and an outflow tract morphology, 22.5% burden.    I reviewed his " echocardiogram images.  They demonstrate a global hypokinesis with ejection fraction of 40%.    Assessment:       Diagnosis Plan   1. PVC (premature ventricular contraction)  Case Request EP Lab: Ablation PVC          Plan:       Patient complains of minimal symptoms, but does have associated decrease in ejection fraction.  I discussed the option for suppression with medication, but patient was not really in favor of long-term medical treatment.  We discussed ablation.  Discussed risk of cardiac injury, pericardial effusion, conduction system damage, stroke and death.  Patient would like to proceed with ablation with attempt at elimination of PVCs.    As always, it has been a pleasure to participate in your patient's care.      Sincerely,         Abisai Blanca MD

## 2020-02-13 ENCOUNTER — LAB (OUTPATIENT)
Dept: LAB | Facility: HOSPITAL | Age: 75
End: 2020-02-13

## 2020-02-13 DIAGNOSIS — I49.3 PVC (PREMATURE VENTRICULAR CONTRACTION): ICD-10-CM

## 2020-02-13 DIAGNOSIS — Z13.6 SCREENING FOR CARDIOVASCULAR CONDITION: ICD-10-CM

## 2020-02-13 DIAGNOSIS — Z01.810 PREPROCEDURAL CARDIOVASCULAR EXAMINATION: ICD-10-CM

## 2020-02-13 LAB
ANION GAP SERPL CALCULATED.3IONS-SCNC: 13.6 MMOL/L (ref 5–15)
BASOPHILS # BLD AUTO: 0.04 10*3/MM3 (ref 0–0.2)
BASOPHILS NFR BLD AUTO: 0.6 % (ref 0–1.5)
BUN BLD-MCNC: 15 MG/DL (ref 8–23)
BUN/CREAT SERPL: 17.2 (ref 7–25)
CALCIUM SPEC-SCNC: 9.4 MG/DL (ref 8.6–10.5)
CHLORIDE SERPL-SCNC: 107 MMOL/L (ref 98–107)
CO2 SERPL-SCNC: 22.4 MMOL/L (ref 22–29)
CREAT BLD-MCNC: 0.87 MG/DL (ref 0.76–1.27)
DEPRECATED RDW RBC AUTO: 41.4 FL (ref 37–54)
EOSINOPHIL # BLD AUTO: 0.23 10*3/MM3 (ref 0–0.4)
EOSINOPHIL NFR BLD AUTO: 3.6 % (ref 0.3–6.2)
ERYTHROCYTE [DISTWIDTH] IN BLOOD BY AUTOMATED COUNT: 12.8 % (ref 12.3–15.4)
GFR SERPL CREATININE-BSD FRML MDRD: 86 ML/MIN/1.73
GLUCOSE BLD-MCNC: 94 MG/DL (ref 65–99)
HCT VFR BLD AUTO: 44 % (ref 37.5–51)
HGB BLD-MCNC: 14.8 G/DL (ref 13–17.7)
IMM GRANULOCYTES # BLD AUTO: 0.06 10*3/MM3 (ref 0–0.05)
IMM GRANULOCYTES NFR BLD AUTO: 0.9 % (ref 0–0.5)
LYMPHOCYTES # BLD AUTO: 1.4 10*3/MM3 (ref 0.7–3.1)
LYMPHOCYTES NFR BLD AUTO: 22 % (ref 19.6–45.3)
MCH RBC QN AUTO: 29.8 PG (ref 26.6–33)
MCHC RBC AUTO-ENTMCNC: 33.6 G/DL (ref 31.5–35.7)
MCV RBC AUTO: 88.5 FL (ref 79–97)
MONOCYTES # BLD AUTO: 0.59 10*3/MM3 (ref 0.1–0.9)
MONOCYTES NFR BLD AUTO: 9.3 % (ref 5–12)
NEUTROPHILS # BLD AUTO: 4.04 10*3/MM3 (ref 1.7–7)
NEUTROPHILS NFR BLD AUTO: 63.6 % (ref 42.7–76)
NRBC BLD AUTO-RTO: 0 /100 WBC (ref 0–0.2)
PLATELET # BLD AUTO: 145 10*3/MM3 (ref 140–450)
PMV BLD AUTO: 10.8 FL (ref 6–12)
POTASSIUM BLD-SCNC: 4.5 MMOL/L (ref 3.5–5.2)
RBC # BLD AUTO: 4.97 10*6/MM3 (ref 4.14–5.8)
SODIUM BLD-SCNC: 143 MMOL/L (ref 136–145)
WBC NRBC COR # BLD: 6.36 10*3/MM3 (ref 3.4–10.8)

## 2020-02-13 PROCEDURE — 36415 COLL VENOUS BLD VENIPUNCTURE: CPT

## 2020-02-13 PROCEDURE — 85025 COMPLETE CBC W/AUTO DIFF WBC: CPT

## 2020-02-13 PROCEDURE — 80048 BASIC METABOLIC PNL TOTAL CA: CPT

## 2020-02-17 ENCOUNTER — HOSPITAL ENCOUNTER (OUTPATIENT)
Facility: HOSPITAL | Age: 75
Discharge: HOME | End: 2020-02-18
Attending: INTERNAL MEDICINE | Admitting: INTERNAL MEDICINE

## 2020-02-17 DIAGNOSIS — I49.3 PVC (PREMATURE VENTRICULAR CONTRACTION): ICD-10-CM

## 2020-02-17 LAB
ACT BLD: 180 SECONDS (ref 82–152)
ACT BLD: 274 SECONDS (ref 82–152)
ACT BLD: 279 SECONDS (ref 82–152)
ACT BLD: 307 SECONDS (ref 82–152)
ACT BLD: 312 SECONDS (ref 82–152)
ACT BLD: 318 SECONDS (ref 82–152)
ACT BLD: 340 SECONDS (ref 82–152)
ACT BLD: 362 SECONDS (ref 82–152)

## 2020-02-17 PROCEDURE — 93010 ELECTROCARDIOGRAM REPORT: CPT | Performed by: INTERNAL MEDICINE

## 2020-02-17 PROCEDURE — C1894 INTRO/SHEATH, NON-LASER: HCPCS | Performed by: INTERNAL MEDICINE

## 2020-02-17 PROCEDURE — C1893 INTRO/SHEATH, FIXED,NON-PEEL: HCPCS | Performed by: INTERNAL MEDICINE

## 2020-02-17 PROCEDURE — 93662 INTRACARDIAC ECG (ICE): CPT | Performed by: INTERNAL MEDICINE

## 2020-02-17 PROCEDURE — 99152 MOD SED SAME PHYS/QHP 5/>YRS: CPT | Performed by: INTERNAL MEDICINE

## 2020-02-17 PROCEDURE — 93005 ELECTROCARDIOGRAM TRACING: CPT | Performed by: INTERNAL MEDICINE

## 2020-02-17 PROCEDURE — 93462 L HRT CATH TRNSPTL PUNCTURE: CPT | Performed by: INTERNAL MEDICINE

## 2020-02-17 PROCEDURE — C1732 CATH, EP, DIAG/ABL, 3D/VECT: HCPCS | Performed by: INTERNAL MEDICINE

## 2020-02-17 PROCEDURE — C1766 INTRO/SHEATH,STRBLE,NON-PEEL: HCPCS | Performed by: INTERNAL MEDICINE

## 2020-02-17 PROCEDURE — 25010000002 MIDAZOLAM PER 1 MG: Performed by: INTERNAL MEDICINE

## 2020-02-17 PROCEDURE — 93654 COMPRE EP EVAL TX VT: CPT | Performed by: INTERNAL MEDICINE

## 2020-02-17 PROCEDURE — 25010000002 FENTANYL CITRATE (PF) 100 MCG/2ML SOLUTION: Performed by: INTERNAL MEDICINE

## 2020-02-17 PROCEDURE — C1730 CATH, EP, 19 OR FEW ELECT: HCPCS | Performed by: INTERNAL MEDICINE

## 2020-02-17 PROCEDURE — C1759 CATH, INTRA ECHOCARDIOGRAPHY: HCPCS | Performed by: INTERNAL MEDICINE

## 2020-02-17 PROCEDURE — C1769 GUIDE WIRE: HCPCS | Performed by: INTERNAL MEDICINE

## 2020-02-17 PROCEDURE — 85347 COAGULATION TIME ACTIVATED: CPT

## 2020-02-17 PROCEDURE — 0 IOPAMIDOL PER 1 ML: Performed by: INTERNAL MEDICINE

## 2020-02-17 PROCEDURE — G0378 HOSPITAL OBSERVATION PER HR: HCPCS

## 2020-02-17 PROCEDURE — 25010000002 HEPARIN (PORCINE) PER 1000 UNITS: Performed by: INTERNAL MEDICINE

## 2020-02-17 PROCEDURE — 99153 MOD SED SAME PHYS/QHP EA: CPT | Performed by: INTERNAL MEDICINE

## 2020-02-17 PROCEDURE — 25010000002 PROTAMINE SULFATE PER 10 MG: Performed by: INTERNAL MEDICINE

## 2020-02-17 RX ORDER — CETIRIZINE HYDROCHLORIDE 10 MG/1
5 TABLET ORAL DAILY PRN
Status: DISCONTINUED | OUTPATIENT
Start: 2020-02-17 | End: 2020-02-18 | Stop reason: HOSPADM

## 2020-02-17 RX ORDER — FENTANYL CITRATE 50 UG/ML
INJECTION, SOLUTION INTRAMUSCULAR; INTRAVENOUS AS NEEDED
Status: DISCONTINUED | OUTPATIENT
Start: 2020-02-17 | End: 2020-02-17 | Stop reason: HOSPADM

## 2020-02-17 RX ORDER — HEPARIN SODIUM 1000 [USP'U]/ML
INJECTION, SOLUTION INTRAVENOUS; SUBCUTANEOUS AS NEEDED
Status: DISCONTINUED | OUTPATIENT
Start: 2020-02-17 | End: 2020-02-17 | Stop reason: HOSPADM

## 2020-02-17 RX ORDER — NALOXONE HCL 0.4 MG/ML
0.4 VIAL (ML) INJECTION
Status: DISCONTINUED | OUTPATIENT
Start: 2020-02-17 | End: 2020-02-18 | Stop reason: HOSPADM

## 2020-02-17 RX ORDER — ACETAMINOPHEN 500 MG
500 TABLET ORAL EVERY 6 HOURS PRN
Status: DISCONTINUED | OUTPATIENT
Start: 2020-02-17 | End: 2020-02-18 | Stop reason: HOSPADM

## 2020-02-17 RX ORDER — MIDAZOLAM HYDROCHLORIDE 1 MG/ML
INJECTION INTRAMUSCULAR; INTRAVENOUS AS NEEDED
Status: DISCONTINUED | OUTPATIENT
Start: 2020-02-17 | End: 2020-02-17 | Stop reason: HOSPADM

## 2020-02-17 RX ORDER — SODIUM CHLORIDE 9 MG/ML
INJECTION, SOLUTION INTRAVENOUS CONTINUOUS PRN
Status: COMPLETED | OUTPATIENT
Start: 2020-02-17 | End: 2020-02-17

## 2020-02-17 RX ORDER — HEPARIN SODIUM 10000 [USP'U]/100ML
INJECTION, SOLUTION INTRAVENOUS CONTINUOUS PRN
Status: DISCONTINUED | OUTPATIENT
Start: 2020-02-17 | End: 2020-02-17 | Stop reason: HOSPADM

## 2020-02-17 RX ORDER — ACETAMINOPHEN 650 MG/1
650 SUPPOSITORY RECTAL EVERY 4 HOURS PRN
Status: DISCONTINUED | OUTPATIENT
Start: 2020-02-17 | End: 2020-02-18 | Stop reason: HOSPADM

## 2020-02-17 RX ORDER — LIDOCAINE HYDROCHLORIDE 10 MG/ML
0.1 INJECTION, SOLUTION EPIDURAL; INFILTRATION; INTRACAUDAL; PERINEURAL ONCE AS NEEDED
Status: DISCONTINUED | OUTPATIENT
Start: 2020-02-17 | End: 2020-02-17

## 2020-02-17 RX ORDER — PROTAMINE SULFATE 10 MG/ML
INJECTION, SOLUTION INTRAVENOUS AS NEEDED
Status: DISCONTINUED | OUTPATIENT
Start: 2020-02-17 | End: 2020-02-17 | Stop reason: HOSPADM

## 2020-02-17 RX ORDER — SODIUM CHLORIDE 0.9 % (FLUSH) 0.9 %
3 SYRINGE (ML) INJECTION EVERY 12 HOURS SCHEDULED
Status: DISCONTINUED | OUTPATIENT
Start: 2020-02-17 | End: 2020-02-17

## 2020-02-17 RX ORDER — ACETAMINOPHEN 325 MG/1
650 TABLET ORAL EVERY 4 HOURS PRN
Status: DISCONTINUED | OUTPATIENT
Start: 2020-02-17 | End: 2020-02-18 | Stop reason: HOSPADM

## 2020-02-17 RX ORDER — SODIUM CHLORIDE 0.9 % (FLUSH) 0.9 %
10 SYRINGE (ML) INJECTION AS NEEDED
Status: DISCONTINUED | OUTPATIENT
Start: 2020-02-17 | End: 2020-02-17

## 2020-02-17 RX ORDER — SODIUM CHLORIDE 9 MG/ML
75 INJECTION, SOLUTION INTRAVENOUS CONTINUOUS
Status: DISCONTINUED | OUTPATIENT
Start: 2020-02-17 | End: 2020-02-18 | Stop reason: HOSPADM

## 2020-02-17 RX ORDER — CARVEDILOL 3.12 MG/1
3.12 TABLET ORAL 2 TIMES DAILY WITH MEALS
Status: DISCONTINUED | OUTPATIENT
Start: 2020-02-17 | End: 2020-02-18

## 2020-02-17 RX ORDER — LIDOCAINE HYDROCHLORIDE AND EPINEPHRINE 10; 10 MG/ML; UG/ML
INJECTION, SOLUTION INFILTRATION; PERINEURAL AS NEEDED
Status: DISCONTINUED | OUTPATIENT
Start: 2020-02-17 | End: 2020-02-17 | Stop reason: HOSPADM

## 2020-02-17 RX ORDER — HYDRALAZINE HYDROCHLORIDE 20 MG/ML
10 INJECTION INTRAMUSCULAR; INTRAVENOUS ONCE
Status: DISCONTINUED | OUTPATIENT
Start: 2020-02-17 | End: 2020-02-17 | Stop reason: HOSPADM

## 2020-02-17 RX ORDER — HYDROMORPHONE HYDROCHLORIDE 1 MG/ML
0.5 INJECTION, SOLUTION INTRAMUSCULAR; INTRAVENOUS; SUBCUTANEOUS
Status: DISCONTINUED | OUTPATIENT
Start: 2020-02-17 | End: 2020-02-18 | Stop reason: HOSPADM

## 2020-02-17 RX ORDER — SODIUM CHLORIDE 9 MG/ML
250 INJECTION, SOLUTION INTRAVENOUS ONCE AS NEEDED
Status: DISCONTINUED | OUTPATIENT
Start: 2020-02-17 | End: 2020-02-18 | Stop reason: HOSPADM

## 2020-02-17 RX ADMIN — SODIUM CHLORIDE 75 ML/HR: 9 INJECTION, SOLUTION INTRAVENOUS at 07:28

## 2020-02-17 RX ADMIN — CARVEDILOL 3.12 MG: 3.12 TABLET, FILM COATED ORAL at 21:17

## 2020-02-18 VITALS
BODY MASS INDEX: 25.18 KG/M2 | WEIGHT: 170 LBS | OXYGEN SATURATION: 96 % | DIASTOLIC BLOOD PRESSURE: 88 MMHG | HEIGHT: 69 IN | HEART RATE: 66 BPM | RESPIRATION RATE: 16 BRPM | SYSTOLIC BLOOD PRESSURE: 151 MMHG | TEMPERATURE: 97.6 F

## 2020-02-18 PROCEDURE — 99217 PR OBSERVATION CARE DISCHARGE MANAGEMENT: CPT | Performed by: NURSE PRACTITIONER

## 2020-02-18 PROCEDURE — G0378 HOSPITAL OBSERVATION PER HR: HCPCS

## 2020-02-18 RX ORDER — CARVEDILOL 12.5 MG/1
12.5 TABLET ORAL EVERY 12 HOURS
Qty: 60 TABLET | Refills: 5 | Status: SHIPPED | OUTPATIENT
Start: 2020-02-18 | End: 2020-07-14 | Stop reason: SDUPTHER

## 2020-02-18 RX ORDER — CARVEDILOL 12.5 MG/1
12.5 TABLET ORAL EVERY 12 HOURS
Status: DISCONTINUED | OUTPATIENT
Start: 2020-02-18 | End: 2020-02-18 | Stop reason: HOSPADM

## 2020-02-18 RX ADMIN — CARVEDILOL 12.5 MG: 12.5 TABLET, FILM COATED ORAL at 10:52

## 2020-02-18 RX ADMIN — CARVEDILOL 3.12 MG: 3.12 TABLET, FILM COATED ORAL at 08:31

## 2020-02-24 ENCOUNTER — TELEPHONE (OUTPATIENT)
Dept: CARDIOLOGY | Facility: CLINIC | Age: 75
End: 2020-02-24

## 2020-02-24 NOTE — TELEPHONE ENCOUNTER
La---let them know that Dr. Blanca is out this week and I am not sure whether he will repeat a Holter at that time---there is no current plan (its not scheduled)-----my guess is Dr. Blanca will see him and if needed will order one that day

## 2020-02-24 NOTE — TELEPHONE ENCOUNTER
Phone# [phone]    Dr. Jadon Toro from [facility] called, informing me that this pt is supposed to go back to work on 2/25.  Dr. Toro would like to know when patient comes back in 4 weeks for a follow up are you going to do a holter to evaluate PVC's or any other testing?  Dr. Toro said if he is not available when we call back to speak with Deepika MOLINA.  La

## 2020-03-06 ENCOUNTER — TELEPHONE (OUTPATIENT)
Dept: CARDIOLOGY | Facility: CLINIC | Age: 75
End: 2020-03-06

## 2020-03-06 NOTE — TELEPHONE ENCOUNTER
Dr. Blanca,    Dr. Jadon Toro would like to speak to you sometime today about this patient.    Call back #: [phone]    Thank you!    Malorie Combs RN  Triage [facility]

## 2020-03-06 NOTE — TELEPHONE ENCOUNTER
I called and discussed with the provider.  The PVCs are likely arising from the LV [place], will be very difficult to ablate.  First attempt unsuccessful.  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

## 2020-03-24 ENCOUNTER — OFFICE VISIT (OUTPATIENT)
Dept: CARDIOLOGY | Facility: CLINIC | Age: 75
End: 2020-03-24

## 2020-03-24 VITALS
HEIGHT: 70 IN | BODY MASS INDEX: 24.34 KG/M2 | HEART RATE: 36 BPM | WEIGHT: 170 LBS | SYSTOLIC BLOOD PRESSURE: 136 MMHG | DIASTOLIC BLOOD PRESSURE: 71 MMHG

## 2020-03-24 DIAGNOSIS — I49.3 PREMATURE VENTRICULAR CONTRACTIONS (PVCS) (VPCS): Primary | Chronic | ICD-10-CM

## 2020-03-24 PROCEDURE — 99213 OFFICE O/P EST LOW 20 MIN: CPT | Performed by: INTERNAL MEDICINE

## 2020-03-24 RX ORDER — FLUTICASONE PROPIONATE 50 MCG
2 SPRAY, SUSPENSION (ML) NASAL DAILY
COMMUNITY

## 2020-03-24 NOTE — PROGRESS NOTES
Date of Office Visit: 2020  Encounter Provider: Abisai Blanca MD  Place of Service: [facility] CARDIOLOGY  Patient Name: Zachary Zayas  :1945    Chief Complaint   Patient presents with   • Irregular Heart Beat     4 wk hosp follow up / PVC ablation    :     This patient has consented to a telehealth visit via telephone. I spent 20 minutes in direct conversation with this patient. All vitals recorded within this visit are reported by the patient.    HPI: Zachary Zayas is a 74 y.o. male who presents today for follow-up of premature ventricular contractions.  Patient had attempt at ablation, but it was unsuccessful.  He is currently managed on beta blocker.  He is feeling well. 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  He denies any symptoms of palpitations, or shortness of breath.           Past Medical History:   Diagnosis Date   • Arthritis    • Asthma    • Colon polyps    • ED (erectile dysfunction)    • NICM (nonischemic cardiomyopathy) (CMS/HCC) 1/15/2020   • PVC's (premature ventricular contractions) 1/15/2020   • Thyroid nodule        Past Surgical History:   Procedure Laterality Date   • CARDIAC CATHETERIZATION N/A 2020    Procedure: Coronary angiography;  Surgeon: Abisai Blanca MD;  Location: [facility] CATH INVASIVE LOCATION;  Service: Cardiovascular;  Laterality: N/A;   • CARDIAC ELECTROPHYSIOLOGY PROCEDURE N/A 2020    Procedure: Ablation PVC- carto;  Surgeon: Abisai Blanca MD;  Location: [facility] CATH INVASIVE LOCATION;  Service: Cardiovascular;  Laterality: N/A;   • COLONOSCOPY N/A 3/8/2019    Procedure: COLONOSCOPY with polypectomy;  Surgeon: Fred Fang MD;  Location: [facility] OR;  Service: Gastroenterology   • HERNIA REPAIR     • NOSE SURGERY     • SHOULDER LIGAMENT REPAIR     • TONSILLECTOMY     • TOTAL HIP ARTHROPLASTY Right        Social History  "    Socioeconomic History   • Marital status:      Spouse name: Not on file   • Number of children: Not on file   • Years of education: Not on file   • Highest education level: Not on file   Occupational History   • Occupation:    Tobacco Use   • Smoking status: Never Smoker   • Smokeless tobacco: Never Used   Substance and Sexual Activity   • Alcohol use: No   • Drug use: No   • Sexual activity: Defer   Social History Narrative    He is retired from [employer]. 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     [employer]        Noonsmoker,nondrinker Confucianism        He is a  and .        Family History   Problem Relation Age of Onset   • Heart attack Father 43   • Hypertension Father    • Sudden death Father        Review of Systems   Constitution: Negative.   Cardiovascular: Negative.    Respiratory: Negative.    Gastrointestinal: Negative.        Allergies   Allergen Reactions   • Influenza Vaccines Other (See Comments)     Declines due to reaction \"3 years in a row.\" States the flu vaccine makes him sick.    • Lactose Intolerance (Gi) GI Intolerance   • Msm [Methylsulfonylmethane] Other (See Comments)     Raises BP         Current Outpatient Medications:   •  acetaminophen (TYLENOL) 500 MG tablet, Take 500 mg by mouth Every 6 (Six) Hours As Needed for Mild Pain ., Disp: , Rfl:   •  Calcium Carb-Cholecalciferol (CALCIUM 600 + D PO), Take 1 tablet by mouth Daily., Disp: , Rfl:   •  carvedilol (COREG) 12.5 MG tablet, Take 1 tablet by mouth Every 12 (Twelve) Hours., Disp: 60 tablet, Rfl: 5  •  cetirizine (zyrTEC) 10 MG tablet, Take 5 mg by mouth As Needed for Allergies., Disp: , Rfl:   •  fluticasone (FLONASE) 50 MCG/ACT nasal spray, 2 sprays into the nostril(s) as directed by provider Daily., Disp: , Rfl:   •  GLUCOSAMINE CHONDROITIN COMPLX PO, Take 2 tablets by mouth Daily., Disp: , Rfl:   •  hypromellose (ARTIFICIAL TEARS) 0.4 % solution, 1 drop As Needed for Dry " "Eyes., Disp: , Rfl:   •  Lysine HCl (L-LYSINE) 500 MG tablet tablet, Take 500 mg by mouth Daily., Disp: , Rfl:   •  Misc Natural Products (SAW PALMETTO COMPLEX EX ST PO), Take 2 tablets by mouth Daily., Disp: , Rfl:   •  PATIENT SUPPLIED ALLERGY INJECTION, Inject  under the skin into the appropriate area as directed 1 (One) Time., Disp: , Rfl:   •  Pyridoxine HCl (VITAMIN B6 PO), Take 1 tablet by mouth Daily., Disp: , Rfl:   •  tadalafil (CIALIS) 10 MG tablet, Take 10 mg by mouth daily as needed for erectile dysfunction., Disp: , Rfl:       Objective:     Vitals:    03/24/20 1052   BP: 136/71   Pulse: (!) 36   Weight: 77.1 kg (170 lb)   Height: 177.8 cm (70\")     Body mass index is 24.39 kg/m².      Assessment:       Diagnosis Plan   1. Premature ventricular contractions (PVCs) (VPCs)            Plan:       Patient is frequent PVCs. Asymptomatic, but slight decline in ejection fraction.  I was not successful with ablation.  Will follow-up this fall and reassess ejection fraction at that time. He will call if he develops worsening symptoms.     As always, it has been a pleasure to participate in your patient's care.      Sincerely,         Abisai Blanca MD  "

## 2020-07-14 RX ORDER — CARVEDILOL 12.5 MG/1
12.5 TABLET ORAL EVERY 12 HOURS
Qty: 60 TABLET | Refills: 5 | Status: SHIPPED | OUTPATIENT
Start: 2020-07-14 | End: 2021-02-02 | Stop reason: SDUPTHER

## 2020-09-23 ENCOUNTER — HOSPITAL ENCOUNTER (EMERGENCY)
Facility: HOSPITAL | Age: 75
Discharge: HOME | End: 2020-09-23
Attending: EMERGENCY MEDICINE | Admitting: EMERGENCY MEDICINE

## 2020-09-23 ENCOUNTER — TELEPHONE (OUTPATIENT)
Dept: INTERNAL MEDICINE | Facility: CLINIC | Age: 75
End: 2020-09-23

## 2020-09-23 ENCOUNTER — APPOINTMENT (OUTPATIENT)
Dept: GENERAL RADIOLOGY | Facility: HOSPITAL | Age: 75
End: 2020-09-23

## 2020-09-23 VITALS
TEMPERATURE: 96.1 F | RESPIRATION RATE: 16 BRPM | DIASTOLIC BLOOD PRESSURE: 90 MMHG | HEIGHT: 71 IN | OXYGEN SATURATION: 94 % | WEIGHT: 170 LBS | BODY MASS INDEX: 23.8 KG/M2 | SYSTOLIC BLOOD PRESSURE: 148 MMHG | HEART RATE: 87 BPM

## 2020-09-23 DIAGNOSIS — J12.82 PNEUMONIA DUE TO COVID-19 VIRUS: Primary | ICD-10-CM

## 2020-09-23 DIAGNOSIS — U07.1 PNEUMONIA DUE TO COVID-19 VIRUS: Primary | ICD-10-CM

## 2020-09-23 LAB
ALBUMIN SERPL-MCNC: 3.5 G/DL (ref 3.5–5.2)
ALBUMIN/GLOB SERPL: 1.1 G/DL
ALP SERPL-CCNC: 59 U/L (ref 39–117)
ALT SERPL W P-5'-P-CCNC: 14 U/L (ref 1–41)
ANION GAP SERPL CALCULATED.3IONS-SCNC: 13.1 MMOL/L (ref 5–15)
AST SERPL-CCNC: 22 U/L (ref 1–40)
BASOPHILS # BLD AUTO: 0.02 10*3/MM3 (ref 0–0.2)
BASOPHILS NFR BLD AUTO: 0.3 % (ref 0–1.5)
BILIRUB SERPL-MCNC: 0.6 MG/DL (ref 0–1.2)
BILIRUB UR QL STRIP: NEGATIVE
BUN SERPL-MCNC: 12 MG/DL (ref 8–23)
BUN/CREAT SERPL: 15.8 (ref 7–25)
CALCIUM SPEC-SCNC: 9.2 MG/DL (ref 8.6–10.5)
CHLORIDE SERPL-SCNC: 98 MMOL/L (ref 98–107)
CLARITY UR: ABNORMAL
CO2 SERPL-SCNC: 24.9 MMOL/L (ref 22–29)
COLOR UR: YELLOW
CREAT SERPL-MCNC: 0.76 MG/DL (ref 0.76–1.27)
DEPRECATED RDW RBC AUTO: 40.4 FL (ref 37–54)
EOSINOPHIL # BLD AUTO: 0.03 10*3/MM3 (ref 0–0.4)
EOSINOPHIL NFR BLD AUTO: 0.4 % (ref 0.3–6.2)
ERYTHROCYTE [DISTWIDTH] IN BLOOD BY AUTOMATED COUNT: 12.7 % (ref 12.3–15.4)
FERRITIN SERPL-MCNC: 272 NG/ML (ref 30–400)
GFR SERPL CREATININE-BSD FRML MDRD: 101 ML/MIN/1.73
GLOBULIN UR ELPH-MCNC: 3.2 GM/DL
GLUCOSE SERPL-MCNC: 110 MG/DL (ref 65–99)
GLUCOSE UR STRIP-MCNC: NEGATIVE MG/DL
HCT VFR BLD AUTO: 41.3 % (ref 37.5–51)
HGB BLD-MCNC: 13 G/DL (ref 13–17.7)
HGB UR QL STRIP.AUTO: NEGATIVE
IMM GRANULOCYTES # BLD AUTO: 0.06 10*3/MM3 (ref 0–0.05)
IMM GRANULOCYTES NFR BLD AUTO: 0.8 % (ref 0–0.5)
KETONES UR QL STRIP: NEGATIVE
LEUKOCYTE ESTERASE UR QL STRIP.AUTO: NEGATIVE
LYMPHOCYTES # BLD AUTO: 0.73 10*3/MM3 (ref 0.7–3.1)
LYMPHOCYTES NFR BLD AUTO: 10 % (ref 19.6–45.3)
MCH RBC QN AUTO: 27.3 PG (ref 26.6–33)
MCHC RBC AUTO-ENTMCNC: 31.5 G/DL (ref 31.5–35.7)
MCV RBC AUTO: 86.6 FL (ref 79–97)
MONOCYTES # BLD AUTO: 0.45 10*3/MM3 (ref 0.1–0.9)
MONOCYTES NFR BLD AUTO: 6.2 % (ref 5–12)
NEUTROPHILS NFR BLD AUTO: 6.02 10*3/MM3 (ref 1.7–7)
NEUTROPHILS NFR BLD AUTO: 82.3 % (ref 42.7–76)
NITRITE UR QL STRIP: NEGATIVE
NRBC BLD AUTO-RTO: 0 /100 WBC (ref 0–0.2)
PH UR STRIP.AUTO: 6 [PH] (ref 4.5–8)
PLATELET # BLD AUTO: 153 10*3/MM3 (ref 140–450)
PMV BLD AUTO: 11.4 FL (ref 6–12)
POTASSIUM SERPL-SCNC: 4.4 MMOL/L (ref 3.5–5.2)
PROCALCITONIN SERPL-MCNC: 0.11 NG/ML (ref 0–0.25)
PROT SERPL-MCNC: 6.7 G/DL (ref 6–8.5)
PROT UR QL STRIP: NEGATIVE
RBC # BLD AUTO: 4.77 10*6/MM3 (ref 4.14–5.8)
SODIUM SERPL-SCNC: 136 MMOL/L (ref 136–145)
SP GR UR STRIP: 1.01 (ref 1–1.03)
UROBILINOGEN UR QL STRIP: ABNORMAL
WBC # BLD AUTO: 7.31 10*3/MM3 (ref 3.4–10.8)

## 2020-09-23 PROCEDURE — 71045 X-RAY EXAM CHEST 1 VIEW: CPT

## 2020-09-23 PROCEDURE — 99284 EMERGENCY DEPT VISIT MOD MDM: CPT | Performed by: EMERGENCY MEDICINE

## 2020-09-23 PROCEDURE — 99283 EMERGENCY DEPT VISIT LOW MDM: CPT

## 2020-09-23 PROCEDURE — 93005 ELECTROCARDIOGRAM TRACING: CPT | Performed by: EMERGENCY MEDICINE

## 2020-09-23 PROCEDURE — 80053 COMPREHEN METABOLIC PANEL: CPT | Performed by: EMERGENCY MEDICINE

## 2020-09-23 PROCEDURE — 84145 PROCALCITONIN (PCT): CPT | Performed by: EMERGENCY MEDICINE

## 2020-09-23 PROCEDURE — 81003 URINALYSIS AUTO W/O SCOPE: CPT | Performed by: EMERGENCY MEDICINE

## 2020-09-23 PROCEDURE — 85025 COMPLETE CBC W/AUTO DIFF WBC: CPT | Performed by: EMERGENCY MEDICINE

## 2020-09-23 PROCEDURE — 82728 ASSAY OF FERRITIN: CPT | Performed by: EMERGENCY MEDICINE

## 2020-09-23 PROCEDURE — 93010 ELECTROCARDIOGRAM REPORT: CPT | Performed by: INTERNAL MEDICINE

## 2020-09-23 RX ADMIN — SODIUM CHLORIDE 1000 ML: 9 INJECTION, SOLUTION INTRAVENOUS at 14:07

## 2020-09-23 NOTE — DISCHARGE INSTRUCTIONS
Return to the emergency room if you develop worsening shortness of breath, difficulty breathing, uncontrollable vomiting, abdominal pain or for any other concerns.

## 2020-09-23 NOTE — TELEPHONE ENCOUNTER
He will need an ER evaluation if increasing fever and SOA to check for pneumonia or respiratory distress in the presence of Covid. Hydroxychloroquine is not currently FDA approved for Covid 19, so we do not prescribe for this.

## 2020-09-23 NOTE — TELEPHONE ENCOUNTER
SPOKE TO PATIENT AGAIN, HE IS GOING TO [facility] ER FOR EVALUATION. STILL WITH FEVER AND SOA

## 2020-09-23 NOTE — TELEPHONE ENCOUNTER
PATIENT CALLED CONCERNED AFTER TESTED POSITIVE FOR COVID 19 LAST Thursday, STARTED EXPERIENCING FEVER AND SOB SINCE Friday, TEMPERATURE EVERY DAY HAS BEEN GOING HIGHER AND HIGHER, STARTED ON Friday IN THE 99S, AND TODAY AS HIGH .6. SOME COUGH AS WELL AND TROUBLE BREATHING.    PLEASE ADVISE    [phone]

## 2020-09-23 NOTE — ED PROVIDER NOTES
EMERGENCY DEPARTMENT ENCOUNTER      Room Number: 1B/1B      HPI:    Chief complaint: Shortness of breath    Location:     Quality/Severity: Mild    Timing/Duration: Ongoing for last 2 days    Modifying Factors: Deep breathing causes coughing, denies dyspnea on exertion    Associated Symptoms: Cough    Narrative: Pt is a 75 y.o. male who presents complaining of shortness of breath approximately 1 week after being diagnosed with COVID.  He says that he had some slight cough was tested and was positive.  The last 2 days he is had more cough which he thought could be his allergies but when he talk to his primary care physician they recommended he come here for evaluation.  He says that taking a deep breath makes him cough more but it is not painful and he is not feeling like he cannot catch his breath just that he does not want to do it breathe deeply.  Denies having fevers at home      PMD: Marianne Wasserman APRN    REVIEW OF SYSTEMS  Review of Systems   Constitutional: Negative for activity change, chills and fever.   HENT: Negative for facial swelling and trouble swallowing.    Respiratory: Positive for cough and shortness of breath. Negative for chest tightness, wheezing and stridor.    Cardiovascular: Negative for chest pain and leg swelling.   Gastrointestinal: Negative for abdominal distention, abdominal pain, blood in stool, nausea and vomiting.   Genitourinary: Negative for difficulty urinating, dysuria, flank pain and frequency.   Musculoskeletal: Negative for arthralgias and gait problem.   Skin: Negative for rash and wound.   Neurological: Negative for dizziness, syncope and numbness.   Psychiatric/Behavioral: Negative for self-injury and suicidal ideas.         PAST MEDICAL HISTORY  Active Ambulatory Problems     Diagnosis Date Noted   • Benign non-nodular prostatic hyperplasia without lower urinary tract symptoms 06/09/2016   • Chronic fatigue 06/09/2016   • Solitary thyroid nodule 06/09/2016   • Erectile  dysfunction 06/09/2016   • S/P hip replacement 11/14/2014   • Tubular adenoma 12/07/2018   • Actinic keratosis 12/07/2018   • Adenomatous polyp of colon 12/17/2018   • Medicare annual wellness visit, initial 10/08/2019   • Bilateral hearing loss 10/08/2019   • Varicose veins of left leg with edema 10/08/2019   • Premature ventricular contractions (PVCs) (VPCs) 11/11/2019   • Sinus arrhythmia 11/11/2019   • PVC's (premature ventricular contractions) 01/15/2020   • NICM (nonischemic cardiomyopathy) (CMS/HCC) 01/15/2020   • PVC (premature ventricular contraction) 01/30/2020     Resolved Ambulatory Problems     Diagnosis Date Noted   • No Resolved Ambulatory Problems     Past Medical History:   Diagnosis Date   • Allergies    • Arthritis    • Asthma    • Colon polyps    • ED (erectile dysfunction)    • Thyroid nodule        PAST SURGICAL HISTORY  Past Surgical History:   Procedure Laterality Date   • CARDIAC CATHETERIZATION N/A 2/17/2020    Procedure: Coronary angiography;  Surgeon: Abisai Blanca MD;  Location:  [facility] CATH INVASIVE LOCATION;  Service: Cardiovascular;  Laterality: N/A;   • CARDIAC ELECTROPHYSIOLOGY PROCEDURE N/A 2/17/2020    Procedure: Ablation PVC- carto;  Surgeon: Abisai Blanca MD;  Location:  [facility] CATH INVASIVE LOCATION;  Service: Cardiovascular;  Laterality: N/A;   • COLONOSCOPY N/A 3/8/2019    Procedure: COLONOSCOPY with polypectomy;  Surgeon: Fred Fang MD;  Location: [facility] OR;  Service: Gastroenterology   • HERNIA REPAIR     • NOSE SURGERY     • SHOULDER LIGAMENT REPAIR     • TONSILLECTOMY     • TOTAL HIP ARTHROPLASTY Right 2000       FAMILY HISTORY  Family History   Problem Relation Age of Onset   • Heart attack Father 43   • Hypertension Father    • Sudden death Father        SOCIAL HISTORY  Social History     Socioeconomic History   • Marital status:      Spouse name: Not on file   • Number of children: Not on file   • Years of education: Not on file    • Highest education level: Not on file   Occupational History   • Occupation:    Tobacco Use   • Smoking status: Never Smoker   • Smokeless tobacco: Never Used   Substance and Sexual Activity   • Alcohol use: No   • Drug use: No   • Sexual activity: Defer   Social History Narrative    He is retired from [employer]. 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     Wilda Iniguezt        Noonsmoker,nondrinker [employer]        He is a  and .        ALLERGIES  Influenza vaccines, Lactose intolerance (gi), and Msm [methylsulfonylmethane]    No current facility-administered medications for this encounter.     Current Outpatient Medications:   •  acetaminophen (TYLENOL) 500 MG tablet, Take 500 mg by mouth Every 6 (Six) Hours As Needed for Mild Pain ., Disp: , Rfl:   •  Calcium Carb-Cholecalciferol (CALCIUM 600 + D PO), Take 1 tablet by mouth Daily., Disp: , Rfl:   •  carvedilol (COREG) 12.5 MG tablet, Take 1 tablet by mouth Every 12 (Twelve) Hours., Disp: 60 tablet, Rfl: 5  •  cetirizine (zyrTEC) 10 MG tablet, Take 5 mg by mouth As Needed for Allergies., Disp: , Rfl:   •  fluticasone (FLONASE) 50 MCG/ACT nasal spray, 2 sprays into the nostril(s) as directed by provider Daily., Disp: , Rfl:   •  GLUCOSAMINE CHONDROITIN COMPLX PO, Take 2 tablets by mouth Daily., Disp: , Rfl:   •  hypromellose (ARTIFICIAL TEARS) 0.4 % solution, 1 drop As Needed for Dry Eyes., Disp: , Rfl:   •  Misc Natural Products (SAW PALMETTO COMPLEX EX ST PO), Take 2 tablets by mouth Daily., Disp: , Rfl:   •  PATIENT SUPPLIED ALLERGY INJECTION, Inject  under the skin into the appropriate area as directed 1 (One) Time., Disp: , Rfl:   •  Pyridoxine HCl (VITAMIN B6 PO), Take 1 tablet by mouth Daily., Disp: , Rfl:   •  tadalafil (CIALIS) 10 MG tablet, Take 10 mg by mouth daily as needed for erectile dysfunction., Disp: , Rfl:     PHYSICAL EXAM  ED Triage Vitals [09/23/20 1332]   Temp Heart Rate Resp BP SpO2   96.1 °F  (35.6 °C) 69 16 120/85 95 %      Temp src Heart Rate Source Patient Position BP Location FiO2 (%)   Oral Monitor -- -- --       Physical Exam  INITIAL VITAL SIGNS: Reviewed by me.  Pulse ox normal  GENERAL: Alert and interactive. No acute distress.  HEAD: Head is normocephalic.  EYES: EOMI. PERRL. No scleral icterus. No conjunctival injection.  ENT: Moist mucous membranes.   NECK: Supple. Full range of motion.  RESPIRATORY: No tachypnea. Clear breath sounds bilaterally. No wheezing. No rales. No rhonchi.  CV: Regular rate and rhythm. No murmurs. No rubs or gallops.  ABDOMEN: Soft, non-distended, non-tender. No guarding. No rebound. No masses.   BACK:  No obvious deformity.  EXTREMITIES: No deformity. No clubbing or cyanosis. No edema.   SKIN: Warm and dry. No diaphoresis. No obvious rashes.   NEUROLOGIC: Alert and oriented. Face is symmetric. Speech is normal. Moves all extremities equally. Motor and sensory distally intact.       LAB RESULTS  Results for orders placed or performed during the hospital encounter of 09/23/20   Comprehensive Metabolic Panel    Specimen: Blood   Result Value Ref Range    Glucose 110 (H) 65 - 99 mg/dL    BUN 12 8 - 23 mg/dL    Creatinine 0.76 0.76 - 1.27 mg/dL    Sodium 136 136 - 145 mmol/L    Potassium 4.4 3.5 - 5.2 mmol/L    Chloride 98 98 - 107 mmol/L    CO2 24.9 22.0 - 29.0 mmol/L    Calcium 9.2 8.6 - 10.5 mg/dL    Total Protein 6.7 6.0 - 8.5 g/dL    Albumin 3.50 3.50 - 5.20 g/dL    ALT (SGPT) 14 1 - 41 U/L    AST (SGOT) 22 1 - 40 U/L    Alkaline Phosphatase 59 39 - 117 U/L    Total Bilirubin 0.6 0.0 - 1.2 mg/dL    eGFR Non African Amer 101 >60 mL/min/1.73    Globulin 3.2 gm/dL    A/G Ratio 1.1 g/dL    BUN/Creatinine Ratio 15.8 7.0 - 25.0    Anion Gap 13.1 5.0 - 15.0 mmol/L   Urinalysis With Microscopic If Indicated (No Culture) - Urine, Clean Catch    Specimen: Urine, Clean Catch   Result Value Ref Range    Color, UA Yellow Yellow, Straw    Appearance, UA Slightly Cloudy (A) Clear     pH, UA 6.0 4.5 - 8.0    Specific Gravity, UA 1.010 1.003 - 1.030    Glucose, UA Negative Negative    Ketones, UA Negative Negative    Bilirubin, UA Negative Negative    Blood, UA Negative Negative    Protein, UA Negative Negative    Leuk Esterase, UA Negative Negative    Nitrite, UA Negative Negative    Urobilinogen, UA 0.2 E.U./dL 0.2 - 1.0 E.U./dL   Procalcitonin    Specimen: Blood   Result Value Ref Range    Procalcitonin 0.11 0.00 - 0.25 ng/mL   Ferritin    Specimen: Blood   Result Value Ref Range    Ferritin 272.00 30.00 - 400.00 ng/mL   CBC Auto Differential    Specimen: Blood   Result Value Ref Range    WBC 7.31 3.40 - 10.80 10*3/mm3    RBC 4.77 4.14 - 5.80 10*6/mm3    Hemoglobin 13.0 13.0 - 17.7 g/dL    Hematocrit 41.3 37.5 - 51.0 %    MCV 86.6 79.0 - 97.0 fL    MCH 27.3 26.6 - 33.0 pg    MCHC 31.5 31.5 - 35.7 g/dL    RDW 12.7 12.3 - 15.4 %    RDW-SD 40.4 37.0 - 54.0 fl    MPV 11.4 6.0 - 12.0 fL    Platelets 153 140 - 450 10*3/mm3    Neutrophil % 82.3 (H) 42.7 - 76.0 %    Lymphocyte % 10.0 (L) 19.6 - 45.3 %    Monocyte % 6.2 5.0 - 12.0 %    Eosinophil % 0.4 0.3 - 6.2 %    Basophil % 0.3 0.0 - 1.5 %    Immature Grans % 0.8 (H) 0.0 - 0.5 %    Neutrophils, Absolute 6.02 1.70 - 7.00 10*3/mm3    Lymphocytes, Absolute 0.73 0.70 - 3.10 10*3/mm3    Monocytes, Absolute 0.45 0.10 - 0.90 10*3/mm3    Eosinophils, Absolute 0.03 0.00 - 0.40 10*3/mm3    Basophils, Absolute 0.02 0.00 - 0.20 10*3/mm3    Immature Grans, Absolute 0.06 (H) 0.00 - 0.05 10*3/mm3    nRBC 0.0 0.0 - 0.2 /100 WBC         I ordered the above labs and reviewed the results    RADIOLOGY  Xr Chest 1 View    Result Date: 9/23/2020  CHEST X-RAY, 09/23/2020     HISTORY: 75-year-old male in the ED with 5 day history of worsening shortness of breath and fever. Positive testing for COVID-19 last week.  TECHNIQUE: AP portable upright chest x-ray.  FINDINGS: Moderately dense patchy airspace infiltrates are scattered in a multifocal distribution throughout both  lungs, greatest in the peripheral left midlung. The findings are compatible with COVID-19 pneumonitis in the current clinical setting.  No dense airspace consolidation or pleural effusion. Heart size and pulmonary vascularity are within normal limits. Severe bilateral chronic degenerative shoulder arthropathy.      1.  Moderately dense multifocal airspace infiltrates throughout both lungs as detailed above. 2.  COVID-19 pneumonia is likely.  This report was finalized on 9/23/2020 3:26 PM by Dr. Alvaro Iniguez MD.        I ordered the above radiologic testing and reviewed the results    PROCEDURES  Procedures  EKG           EKG time/Interp time: 1338/1343  Rhythm/Rate: Sinus rhythm rate of 70  P waves and KS: Normal P waves normal KS interval  QRS, axis: Normal QRS duration and normal axis  ST and T waves: No ST elevations or depressions    Independently interpreted by me contemporaneously with treatment      PROGRESS AND CONSULTS           MEDICAL DECISION MAKING      MDM     Very well-appearing 75-year-old male with positive cover test here with what he calls shortness of breath.  On questioning he says is really that he does not want to take a deep breath because it makes him cough and so he has been breathing shallowly.  He is able to take a deep breath without pain and he feels like he is getting enough oxygen, he likens it to having his seasonal allergies which he thought might be the case.  He says he really only came here at the behest of his primary care physician.  We will check labs including procalcitonin ferritin, get a chest x-ray    Chest x-ray is consistent with COVID pneumonia, his labs are notable for having a normal white count, normal procalcitonin, normal ferritin.  His vital signs have remained normal his entire stay here including normal O2 sat on room air.  I have discussed with the patient going home which he would like to do I have stressed he should return if he develops worsening  symptoms.  DIAGNOSIS  Final diagnoses:   Pneumonia due to COVID-19 virus       Latest Documented Vital Signs:  As of 15:53 EDT  BP- 148/90 HR- 87 Temp- 96.1 °F (35.6 °C) (Oral) O2 sat- 94%    DISPOSITION  Home      Discussed pertinent labs and imaging findings with the patient/family.  Patient/Family voiced understanding of need to follow-up for recheck, further testing as needed.  Return to the emergency Department warnings were given.         Medication List      No changes were made to your prescriptions during this visit.             Follow-up Information     Marianne Wasserman APRN. Call today.    Specialties: Family Medicine, Emergency Medicine  Why: To schedule follow-up appointment  Contact information:  [address]  [address]  [place] [address] [phone]                     Dictated utilizing Dragon dictation     Brando Kendrick MD  09/23/20 0501

## 2020-09-23 NOTE — TELEPHONE ENCOUNTER
Called patient, no answer left msg that with the Shortness of breath, I advised he go to ER. I left my name and office number for him to call me back if he needs to.

## 2020-09-25 ENCOUNTER — TELEPHONE (OUTPATIENT)
Dept: INTERNAL MEDICINE | Facility: CLINIC | Age: 75
End: 2020-09-25

## 2020-09-25 NOTE — TELEPHONE ENCOUNTER
PATIENT WAS DIAGNOSIS POSITIVE WITH COVID -19 ON  9-16-20.    PATIENT WENT TO THE ER AND FOUND OUT THAT HE HAS PNEUMONIA IN HIS LEFT LUNG     PATIENT WENT HOME AND WOULD LIKE TO KNOW IF THERE IS ANYTHING HE COULD DO ABOUT THE PNEUMONIA.    PATIENT HAS BEEN HAVING A FEVER BETWEEN 100- 102.6 AND HE HAS BEEN TAKING TYLENOL AND WANTED TO KNOW IF THAT WAS CORRECT      PLEASE CONTACT PATIENT @[phone]

## 2020-09-25 NOTE — TELEPHONE ENCOUNTER
This is a viral pneumonia. No abx are indicated. Care at this point would be supportive and focused on symptom relief.

## 2020-10-01 DIAGNOSIS — Z00.00 MEDICARE ANNUAL WELLNESS VISIT, INITIAL: ICD-10-CM

## 2020-10-01 DIAGNOSIS — R53.82 CHRONIC FATIGUE: Primary | ICD-10-CM

## 2020-10-01 DIAGNOSIS — N52.1 ERECTILE DYSFUNCTION DUE TO DISEASES CLASSIFIED ELSEWHERE: ICD-10-CM

## 2020-10-02 ENCOUNTER — LAB (OUTPATIENT)
Dept: INTERNAL MEDICINE | Facility: CLINIC | Age: 75
End: 2020-10-02

## 2020-10-02 DIAGNOSIS — E78.5 HYPERLIPIDEMIA, UNSPECIFIED HYPERLIPIDEMIA TYPE: ICD-10-CM

## 2020-10-02 DIAGNOSIS — I42.9 CARDIOMYOPATHY, UNSPECIFIED TYPE (HCC): ICD-10-CM

## 2020-10-02 DIAGNOSIS — Z12.5 SCREENING FOR PROSTATE CANCER: ICD-10-CM

## 2020-10-02 DIAGNOSIS — R53.83 FATIGUE, UNSPECIFIED TYPE: ICD-10-CM

## 2020-10-02 DIAGNOSIS — Z79.899 HIGH RISK MEDICATION USE: ICD-10-CM

## 2020-10-02 DIAGNOSIS — I42.9 CARDIOMYOPATHY, UNSPECIFIED TYPE (HCC): Primary | ICD-10-CM

## 2020-10-03 LAB
ALBUMIN SERPL-MCNC: 3.3 G/DL (ref 3.5–5.2)
ALBUMIN/GLOB SERPL: 1.3 G/DL
ALP SERPL-CCNC: 69 U/L (ref 39–117)
ALT SERPL-CCNC: 52 U/L (ref 1–41)
AST SERPL-CCNC: 36 U/L (ref 1–40)
BASOPHILS # BLD AUTO: 0.06 10*3/MM3 (ref 0–0.2)
BASOPHILS NFR BLD AUTO: 1 % (ref 0–1.5)
BILIRUB SERPL-MCNC: 0.3 MG/DL (ref 0–1.2)
BUN SERPL-MCNC: 14 MG/DL (ref 8–23)
BUN/CREAT SERPL: 17.1 (ref 7–25)
CALCIUM SERPL-MCNC: 8.8 MG/DL (ref 8.6–10.5)
CHLORIDE SERPL-SCNC: 106 MMOL/L (ref 98–107)
CHOLEST SERPL-MCNC: 155 MG/DL (ref 0–200)
CHOLEST/HDLC SERPL: 5.74 {RATIO}
CO2 SERPL-SCNC: 23.9 MMOL/L (ref 22–29)
CREAT SERPL-MCNC: 0.82 MG/DL (ref 0.76–1.27)
EOSINOPHIL # BLD AUTO: 0.17 10*3/MM3 (ref 0–0.4)
EOSINOPHIL NFR BLD AUTO: 3 % (ref 0.3–6.2)
ERYTHROCYTE [DISTWIDTH] IN BLOOD BY AUTOMATED COUNT: 12.9 % (ref 12.3–15.4)
GLOBULIN SER CALC-MCNC: 2.6 GM/DL
GLUCOSE SERPL-MCNC: 91 MG/DL (ref 65–99)
HCT VFR BLD AUTO: 37 % (ref 37.5–51)
HDLC SERPL-MCNC: 27 MG/DL (ref 40–60)
HGB BLD-MCNC: 12.1 G/DL (ref 13–17.7)
IMM GRANULOCYTES # BLD AUTO: 0.23 10*3/MM3 (ref 0–0.05)
IMM GRANULOCYTES NFR BLD AUTO: 4 % (ref 0–0.5)
LDLC SERPL CALC-MCNC: 111 MG/DL (ref 0–100)
LYMPHOCYTES # BLD AUTO: 0.92 10*3/MM3 (ref 0.7–3.1)
LYMPHOCYTES NFR BLD AUTO: 16 % (ref 19.6–45.3)
MCH RBC QN AUTO: 28.2 PG (ref 26.6–33)
MCHC RBC AUTO-ENTMCNC: 32.7 G/DL (ref 31.5–35.7)
MCV RBC AUTO: 86.2 FL (ref 79–97)
MONOCYTES # BLD AUTO: 0.38 10*3/MM3 (ref 0.1–0.9)
MONOCYTES NFR BLD AUTO: 6.6 % (ref 5–12)
NEUTROPHILS # BLD AUTO: 3.99 10*3/MM3 (ref 1.7–7)
NEUTROPHILS NFR BLD AUTO: 69.4 % (ref 42.7–76)
NRBC BLD AUTO-RTO: 0 /100 WBC (ref 0–0.2)
PLATELET # BLD AUTO: 386 10*3/MM3 (ref 140–450)
POTASSIUM SERPL-SCNC: 4.6 MMOL/L (ref 3.5–5.2)
PROT SERPL-MCNC: 5.9 G/DL (ref 6–8.5)
PSA SERPL-MCNC: 4.39 NG/ML (ref 0–4)
RBC # BLD AUTO: 4.29 10*6/MM3 (ref 4.14–5.8)
SODIUM SERPL-SCNC: 140 MMOL/L (ref 136–145)
T4 FREE SERPL-MCNC: 1.35 NG/DL (ref 0.93–1.7)
TRIGL SERPL-MCNC: 85 MG/DL (ref 0–150)
TSH SERPL DL<=0.005 MIU/L-ACNC: 2.02 UIU/ML (ref 0.27–4.2)
VLDLC SERPL CALC-MCNC: 17 MG/DL
WBC # BLD AUTO: 5.75 10*3/MM3 (ref 3.4–10.8)

## 2020-10-15 ENCOUNTER — OFFICE VISIT (OUTPATIENT)
Dept: INTERNAL MEDICINE | Facility: CLINIC | Age: 75
End: 2020-10-15

## 2020-10-15 VITALS
OXYGEN SATURATION: 97 % | HEIGHT: 71 IN | RESPIRATION RATE: 18 BRPM | SYSTOLIC BLOOD PRESSURE: 132 MMHG | BODY MASS INDEX: 23.49 KG/M2 | DIASTOLIC BLOOD PRESSURE: 82 MMHG | WEIGHT: 167.8 LBS | HEART RATE: 70 BPM | TEMPERATURE: 97.7 F

## 2020-10-15 DIAGNOSIS — I49.3 PREMATURE VENTRICULAR CONTRACTIONS (PVCS) (VPCS): Chronic | ICD-10-CM

## 2020-10-15 DIAGNOSIS — R97.20 ELEVATED PSA: ICD-10-CM

## 2020-10-15 DIAGNOSIS — Z00.00 MEDICARE ANNUAL WELLNESS VISIT, INITIAL: Primary | ICD-10-CM

## 2020-10-15 PROBLEM — M16.12 PRIMARY OSTEOARTHRITIS OF LEFT HIP: Status: ACTIVE | Noted: 2020-05-21

## 2020-10-15 PROCEDURE — G0439 PPPS, SUBSEQ VISIT: HCPCS | Performed by: NURSE PRACTITIONER

## 2020-10-15 PROCEDURE — 99212 OFFICE O/P EST SF 10 MIN: CPT | Performed by: NURSE PRACTITIONER

## 2020-10-15 NOTE — PROGRESS NOTES
QUICK REFERENCE INFORMATION:  The ABCs of Providing the Annual Wellness Visit   CMS.[facility]    Medicare Annual Wellness Visit      Subjective   History of Present Illness    Zachary Zayas is a 75 y.o. male who presents for an Annual Wellness Visit. In addition, we addressed the following health issues:    [facility]     He sees Dr. Holland (allergy), Dr. Longo (Ortho), And Katherine Owen (cardio).    Since last visit here he was diagnosed with Covid 19 and had pneumonia 9-23-20.     He was also admitted to [facility] for a cardiac ablation with Dr. Blanca for PVCs. He is on coreg BID for rate/rhythmn control.     PMH, PSH, SocHx, FamHx, Allergies, and Medications: Reviewed and updated in the Visit Navigator.     Outpatient Medications Prior to Visit   Medication Sig Dispense Refill   • Calcium Carb-Cholecalciferol (CALCIUM 600 + D PO) Take 1 tablet by mouth Daily.     • carvedilol (COREG) 12.5 MG tablet Take 1 tablet by mouth Every 12 (Twelve) Hours. 60 tablet 5   • cetirizine (zyrTEC) 10 MG tablet Take 5 mg by mouth As Needed for Allergies.     • fluticasone (FLONASE) 50 MCG/ACT nasal spray 2 sprays into the nostril(s) as directed by provider Daily.     • GLUCOSAMINE CHONDROITIN COMPLX PO Take 2 tablets by mouth Daily.     • hypromellose (ARTIFICIAL TEARS) 0.4 % solution 1 drop As Needed for Dry Eyes.     • Misc Natural Products (SAW PALMETTO COMPLEX EX ST PO) Take 2 tablets by mouth Daily.     • PATIENT SUPPLIED ALLERGY INJECTION Inject  under the skin into the appropriate area as directed 1 (One) Time.     • Pyridoxine HCl (VITAMIN B6 PO) Take 1 tablet by mouth Daily.     • tadalafil (CIALIS) 10 MG tablet Take 10 mg by mouth daily as needed for erectile dysfunction.     • acetaminophen (TYLENOL) 500 MG tablet Take 500 mg by mouth Every 6 (Six) Hours As Needed for Mild Pain .       No facility-administered medications prior to visit.        Patient Active Problem List   Diagnosis    • Benign non-nodular prostatic hyperplasia without lower urinary tract symptoms   • Chronic fatigue   • Solitary thyroid nodule   • Erectile dysfunction   • S/P hip replacement   • Tubular adenoma   • Actinic keratosis   • Adenomatous polyp of colon   • Medicare annual wellness visit, initial   • Bilateral hearing loss   • Varicose veins of left leg with edema   • Premature ventricular contractions (PVCs) (VPCs)   • Sinus arrhythmia   • PVC's (premature ventricular contractions)   • NICM (nonischemic cardiomyopathy) (CMS/HCC)   • PVC (premature ventricular contraction)       Health Habits:  Dental Exam. up to date  Eye Exam. up to date  Exercise: 3 times/week.  Current exercise activities include: bicycling outdoors    Social:  See review in SnapShot activity and in SocHx section of Visit Navigator.    Health Risk Assessment:  The patient has completed a Health Risk Assessment. This has been reviewed with them and has been scanned into Media Manager as a separate document.    Current Medical Providers:  Patient Care Team:  Marianne Wasserman APRN as PCP - General (Family Medicine)  Marianne Wasserman APRN as PCP - Claims Attributed    The [facility] providers who are involved in the care of this patient are listed above. Additional providers and suppliers are listed below:  See dung    Recent Hospitalizations:  ER visit for pneumonia due to Covid  Total hip replacement.    Age-appropriate Screening Schedule:  Refer to the list below for future screening recommendations based on patient's age. Orders for these recommended tests are listed in the plan section. The patient has been provided with a written plan.    Health Maintenance   Topic Date Due   • TDAP/TD VACCINES (1 - Tdap) 04/21/1966   • Pneumococcal Vaccine 65+ (2 of 2 - PPSV23) 09/01/2017   • ANNUAL WELLNESS VISIT  10/08/2020   • ZOSTER VACCINE (1 of 2) 10/16/2020 (Originally 4/21/1997)   • LIPID PANEL  10/02/2021   • COLONOSCOPY  03/08/2022   • HEPATITIS C  SCREENING  Completed   • INFLUENZA VACCINE  Discontinued       Depression Screen:   PHQ-2/PHQ-9 Depression Screening 10/15/2020   Little interest or pleasure in doing things 0   Feeling down, depressed, or hopeless 0   Trouble falling or staying asleep, or sleeping too much -   Feeling tired or having little energy -   Poor appetite or overeating -   Feeling bad about yourself - or that you are a failure or have let yourself or your family down -   Trouble concentrating on things, such as reading the newspaper or watching television -   Moving or speaking so slowly that other people could have noticed. Or the opposite - being so fidgety or restless that you have been moving around a lot more than usual -   Thoughts that you would be better off dead, or of hurting yourself in some way -   Total Score 0   If you checked off any problems, how difficult have these problems made it for you to do your work, take care of things at home, or get along with other people? -       Functional and Cognitive Screening:  Functional & Cognitive Status 10/8/2019   Do you have difficulty preparing food and eating? No   Do you have difficulty bathing yourself, getting dressed or grooming yourself? No   Do you have difficulty using the toilet? No   Do you have difficulty moving around from place to place? No   Do you have trouble with steps or getting out of a bed or a chair? No   Current Diet Well Balanced Diet   Dental Exam Up to date   Eye Exam Up to date   Exercise (times per week) 3 times per week   Current Exercise Activities Include Weightlifting   Do you need help using the phone?  No   Are you deaf or do you have serious difficulty hearing?  No   Do you need help with transportation? No   Do you need help shopping? No   Do you need help preparing meals?  No   Do you need help with housework?  No   Do you need help with laundry? No   Do you need help taking your medications? No   Do you need help managing money? No   Do you ever  "drive or ride in a car without wearing a seat belt? No   Have you felt unusual stress, anger or loneliness in the last month? No   Who do you live with? Spouse   If you need help, do you have trouble finding someone available to you? No   Have you been bothered in the last four weeks by sexual problems? No   Do you have difficulty concentrating, remembering or making decisions? No       Does the patient have evidence of cognitive impairment? No    Advanced Care Planning:  ACP discussion was held with the patient during this visit. Patient has an advance directive in EMR which is still valid.     Identification of Risk Factors:  Risk factors include: Advance Directive Discussion  Cardiovascular risk  Immunizations Discussed/Encouraged (specific immunizations; adacel Tdap, Influenza and Pneumococcal 23 ).    Review of Systems   Constitutional: Negative.    HENT: Negative.    Eyes: Negative.    Respiratory: Positive for cough.    Cardiovascular: Negative.  Negative for chest pain, palpitations and leg swelling.   Gastrointestinal: Negative.    Endocrine: Negative.    Genitourinary: Negative.    Musculoskeletal: Negative.    Skin: Negative.    Allergic/Immunologic: Positive for environmental allergies.   Hematological: Negative.    Psychiatric/Behavioral: Negative.          /82 (BP Location: Right arm, Patient Position: Sitting, Cuff Size: Adult)   Pulse 70   Temp 97.7 °F (36.5 °C) (Temporal)   Resp 18   Ht 180.3 cm (70.98\")   Wt 76.1 kg (167 lb 12.8 oz)   SpO2 97%   BMI 23.41 kg/m²   General appearance: alert, appears stated age and cooperative  Head: Normocephalic, without obvious abnormality, atraumatic  Eyes: conjunctivae/corneas clear. PERRL, EOM's intact. Fundi benign.  Ears: normal TM's and external ear canals both ears  Nose: Nares normal. Septum midline. Mucosa normal. No drainage or sinus tenderness.  Throat: lips, mucosa, and tongue normal; teeth and gums normal  Neck: no adenopathy, no carotid " "bruit, no JVD, supple, symmetrical, trachea midline and thyroid not enlarged, symmetric, no tenderness/mass/nodules  Back: symmetric, no curvature. ROM normal. No CVA tenderness.  Lungs: clear to auscultation bilaterally  Chest wall: no tenderness  Heart: regular rate and rhythm, S1, S2 normal, no murmur, click, rub or gallop  Abdomen: soft, non-tender; bowel sounds normal; no masses,  no organomegaly  Rectal:  referring to urology  Extremities: extremities normal, atraumatic, no cyanosis or edema  Pulses: 2+ and symmetric  Skin: Skin color, texture, turgor normal. No rashes or lesions  Lymph nodes: Cervical, supraclavicular, and axillary nodes normal.  Neurologic: Grossly normal  Objective     Vitals:    10/15/20 0801   BP: 132/82   BP Location: Right arm   Patient Position: Sitting   Cuff Size: Adult   Pulse: 70   Resp: 18   Temp: 97.7 °F (36.5 °C)   TempSrc: Temporal   SpO2: 97%   Weight: 76.1 kg (167 lb 12.8 oz)   Height: 180.3 cm (70.98\")       Body mass index is 23.41 kg/m².    Assessment/Plan   Patient Self-Management and Personalized Health Advice  The patient has been provided with information about: diet, exercise, weight management, prevention of cardiac or vascular disease, the relationship between weight and GERD and fall prevention and preventive services including:   · Annual Wellness Visit (AWV)  · Cardiovascular Disease Screening Tests (may do this order every 5 years in beneficiaries without signs or symptoms of cardiovascular disease)  · Influenza Vaccine and Administration  · Pneumococcal Vaccine and Administration.    Discussed the patient's BMI with him. The BMI is in the acceptable range.    Orders:     Diagnosis Plan   1. Medicare annual wellness visit, initial     2. Premature ventricular contractions (PVCs) (VPCs)     3. Elevated PSA  Ambulatory Referral to Urology     Follow Up:    1 year    An After Visit Summary and PPPS with all of these plans were given to the patient.             "

## 2021-02-02 RX ORDER — CARVEDILOL 12.5 MG/1
12.5 TABLET ORAL EVERY 12 HOURS
Qty: 60 TABLET | Refills: 5 | Status: SHIPPED | OUTPATIENT
Start: 2021-02-02 | End: 2021-02-08

## 2021-02-02 NOTE — TELEPHONE ENCOUNTER
Patient is calling requesting a refill on his Coreg 12.5mg tablets, states he is about to run out and they need to be called into the [facility] in [facility].     Last office visit 3/24/2020  EKG 9/23/2020   Labs 10/2/2020  Next office visit has not been scheduled yet, patient wanted to mention that he had Covid-19 in September.

## 2021-02-03 ENCOUNTER — TELEPHONE (OUTPATIENT)
Dept: CARDIOLOGY | Facility: CLINIC | Age: 76
End: 2021-02-03

## 2021-02-03 NOTE — TELEPHONE ENCOUNTER
Patient was seen by [employer] this AM for a DOT physical. They are requesting a letter of Cardiac Clearance written stating it is ok for patient to drive. Once written I will fax to [phone]

## 2021-02-08 RX ORDER — CARVEDILOL 12.5 MG/1
TABLET ORAL
Qty: 60 TABLET | Refills: 0 | Status: SHIPPED | OUTPATIENT
Start: 2021-02-08 | End: 2021-04-22

## 2021-02-08 NOTE — TELEPHONE ENCOUNTER
Carvedilol 12.5   Last office visit 3/24/2020 with EKG  Next office visit 4/22/2021  Labs 10/2/2020

## 2021-04-22 ENCOUNTER — OFFICE VISIT (OUTPATIENT)
Dept: CARDIOLOGY | Facility: CLINIC | Age: 76
End: 2021-04-22

## 2021-04-22 VITALS
HEART RATE: 56 BPM | BODY MASS INDEX: 25.2 KG/M2 | OXYGEN SATURATION: 99 % | WEIGHT: 176 LBS | DIASTOLIC BLOOD PRESSURE: 84 MMHG | HEIGHT: 70 IN | SYSTOLIC BLOOD PRESSURE: 136 MMHG | RESPIRATION RATE: 16 BRPM

## 2021-04-22 DIAGNOSIS — I48.19 ATRIAL FIBRILLATION, PERSISTENT (HCC): Primary | ICD-10-CM

## 2021-04-22 PROCEDURE — 99214 OFFICE O/P EST MOD 30 MIN: CPT | Performed by: INTERNAL MEDICINE

## 2021-04-22 PROCEDURE — 93000 ELECTROCARDIOGRAM COMPLETE: CPT | Performed by: INTERNAL MEDICINE

## 2021-04-22 RX ORDER — CARVEDILOL 6.25 MG/1
12.5 TABLET ORAL EVERY 12 HOURS
Qty: 60 TABLET | Refills: 0 | Status: SHIPPED | OUTPATIENT
Start: 2021-04-22 | End: 2021-12-07

## 2021-04-22 NOTE — PROGRESS NOTES
Date of Office Visit: 2021  Encounter Provider: Abisai Blanca MD  Place of Service: [facility] CARDIOLOGY  Patient Name: Zachary Zayas  :1945    Chief Complaint   Patient presents with   • Frequent PVC's   :     HPI: Zachary Zayas is a 76 y.o. male who presents today for premature ventricular contractions.      He denies any symptoms of PVCs.  He would like to cut back on his beta blocker if possible.      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      He has not had any syncope, near syncope, dizziness, or lightheadedness.     He denies any heart failure symptoms       Past Medical History:   Diagnosis Date   • Allergies    • Arthritis    • Asthma    • Colon polyps    • ED (erectile dysfunction)    • NICM (nonischemic cardiomyopathy) (CMS/[facility]) 1/15/2020   • PVC's (premature ventricular contractions) 1/15/2020   • Thyroid nodule        Past Surgical History:   Procedure Laterality Date   • CARDIAC CATHETERIZATION N/A 2020    Procedure: Coronary angiography;  Surgeon: Abisai Blanca MD;  Location:  [facility]ASIVE [facility];  Service: Cardiovascular;  Laterality: N/A;   • CARDIAC ELECTROPHYSIOLOGY PROCEDURE N/A 2020    Procedure: Ablation PVC- carto;  Surgeon: Abisai Blanca MD;  Location:  [facility]VASIVE [facility];  Service: Cardiovascular;  Laterality: N/A;   • COLONOSCOPY N/A 3/8/2019    Procedure: COLONOSCOPY with polypectomy;  Surgeon: Fred Fang MD;  Location: [facility] OR;  Service: Gastroenterology   • HERNIA REPAIR     • NOSE SURGERY     • SHOULDER LIGAMENT REPAIR     • TONSILLECTOMY     • TOTAL HIP ARTHROPLASTY Right        Social History     Socioeconomic History   • Marital status:      Spouse name: Not on file   • Number of children: Not on file   • Years of education: Not on file   • Highest education level: Not on file   Tobacco Use   • Smoking status: Never Smoker  "  • Smokeless tobacco: Never Used   Vaping Use   • Vaping Use: Never used   Substance and Sexual Activity   • Alcohol use: No   • Drug use: No   • Sexual activity: Defer       Family History   Problem Relation Age of Onset   • Heart attack Father 43   • Hypertension Father    • Sudden death Father        Review of Systems   Constitutional: Negative.   Cardiovascular: Negative.    Respiratory: Negative.    Gastrointestinal: Negative.        Allergies   Allergen Reactions   • Influenza Vaccines Other (See Comments)     Declines due to reaction \"3 years in a row.\" States the flu vaccine makes him sick.    • Lactose Intolerance (Gi) GI Intolerance   • Msm [Methylsulfonylmethane] Other (See Comments)     Raises BP         Current Outpatient Medications:   •  acetaminophen (TYLENOL) 500 MG tablet, Take 500 mg by mouth Every 6 (Six) Hours As Needed for Mild Pain ., Disp: , Rfl:   •  Calcium Carb-Cholecalciferol (CALCIUM 600 + D PO), Take 1 tablet by mouth Daily., Disp: , Rfl:   •  carvedilol (COREG) 6.25 MG tablet, Take 2 tablets by mouth Every 12 (Twelve) Hours for 6 days., Disp: 60 tablet, Rfl: 0  •  cetirizine (zyrTEC) 10 MG tablet, Take 5 mg by mouth As Needed for Allergies., Disp: , Rfl:   •  fluticasone (FLONASE) 50 MCG/ACT nasal spray, 2 sprays into the nostril(s) as directed by provider Daily., Disp: , Rfl:   •  GLUCOSAMINE CHONDROITIN COMPLX PO, Take 2 tablets by mouth Daily., Disp: , Rfl:   •  hypromellose (ARTIFICIAL TEARS) 0.4 % solution, 1 drop As Needed for Dry Eyes., Disp: , Rfl:   •  Misc Natural Products (SAW PALMETTO COMPLEX EX ST PO), Take 2 tablets by mouth Daily., Disp: , Rfl:   •  PATIENT SUPPLIED ALLERGY INJECTION, Inject  under the skin into the appropriate area as directed 1 (One) Time., Disp: , Rfl:   •  tadalafil (CIALIS) 10 MG tablet, Take 10 mg by mouth daily as needed for erectile dysfunction., Disp: , Rfl:   •  Pyridoxine HCl (VITAMIN B6 PO), Take 1 tablet by mouth Daily., Disp: , Rfl:     " "  Objective:     Vitals:    04/22/21 1035   BP: 136/84   BP Location: Right arm   Patient Position: Lying   Cuff Size: Adult   Pulse: 56   Resp: 16   SpO2: 99%   Weight: 79.8 kg (176 lb)   Height: 177.8 cm (70\")     Body mass index is 25.25 kg/m².    PHYSICAL EXAM:    Vitals and nursing note reviewed.   Constitutional:       Appearance: Healthy appearance.   Cardiovascular:      Normal rate. Occasional ectopic beats. Regular rhythm.   Edema:     Peripheral edema absent.   Neurological:      Mental Status: Alert.   Psychiatric:         Attention and Perception: Attention and perception normal.         Mood and Affect: Mood and affect normal.             ECG 12 Lead    Date/Time: 4/22/2021 10:55 AM  Performed by: Abisai Blanca MD  Authorized by: Abisai Blanca MD   Comparison: compared with previous ECG   Rhythm: sinus rhythm  Ectopy: unifocal PVCs        I reviewed his ekg from 9/20 when he was in ED with Covid-19.  No PVCs      Assessment:      No diagnosis found.       Plan:       PVCs on ekg today appear of different morphology than those we attempted to ablate.  He has not symptoms of PVC or heart failure.  He has not had any events concerning for LOC.  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  He is going to reduce carvedilol to 6.25 BID. He is going to follow-up in December prior to next DOT physical.     As always, it has been a pleasure to participate in your patient's care.      Sincerely,         Abisai Blanca MD  "

## 2021-04-23 ENCOUNTER — TELEPHONE (OUTPATIENT)
Dept: CARDIOLOGY | Facility: CLINIC | Age: 76
End: 2021-04-23

## 2021-04-23 NOTE — TELEPHONE ENCOUNTER
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 Is it okay to have Katlyn Kemp type..Rachel

## 2021-11-17 ENCOUNTER — TELEPHONE (OUTPATIENT)
Dept: GASTROENTEROLOGY | Facility: CLINIC | Age: 76
End: 2021-11-17

## 2021-12-03 ENCOUNTER — PREP FOR SURGERY (OUTPATIENT)
Dept: OTHER | Facility: HOSPITAL | Age: 76
End: 2021-12-03

## 2021-12-03 DIAGNOSIS — Z01.818 OTHER SPECIFIED PRE-OPERATIVE EXAMINATION: ICD-10-CM

## 2021-12-03 DIAGNOSIS — Z86.010 PERSONAL HISTORY OF COLONIC POLYPS: Primary | ICD-10-CM

## 2021-12-07 ENCOUNTER — TELEPHONE (OUTPATIENT)
Dept: CARDIOLOGY | Facility: CLINIC | Age: 76
End: 2021-12-07

## 2021-12-07 ENCOUNTER — OFFICE VISIT (OUTPATIENT)
Dept: CARDIOLOGY | Facility: CLINIC | Age: 76
End: 2021-12-07

## 2021-12-07 VITALS
WEIGHT: 176 LBS | BODY MASS INDEX: 25.2 KG/M2 | SYSTOLIC BLOOD PRESSURE: 146 MMHG | HEART RATE: 47 BPM | DIASTOLIC BLOOD PRESSURE: 82 MMHG | HEIGHT: 70 IN

## 2021-12-07 DIAGNOSIS — I42.8 NICM (NONISCHEMIC CARDIOMYOPATHY) (HCC): Chronic | ICD-10-CM

## 2021-12-07 DIAGNOSIS — I49.3 PVC'S (PREMATURE VENTRICULAR CONTRACTIONS): Primary | ICD-10-CM

## 2021-12-07 DIAGNOSIS — I10 PRIMARY HYPERTENSION: ICD-10-CM

## 2021-12-07 PROCEDURE — 99214 OFFICE O/P EST MOD 30 MIN: CPT | Performed by: INTERNAL MEDICINE

## 2021-12-07 PROCEDURE — 93000 ELECTROCARDIOGRAM COMPLETE: CPT | Performed by: INTERNAL MEDICINE

## 2021-12-07 RX ORDER — CARVEDILOL 3.12 MG/1
3.12 TABLET ORAL 2 TIMES DAILY
Qty: 180 TABLET | Refills: 3 | Status: SHIPPED | OUTPATIENT
Start: 2021-12-07 | End: 2023-01-17 | Stop reason: SDUPTHER

## 2021-12-07 NOTE — TELEPHONE ENCOUNTER
Patient seen in the office today by Dr. 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     Per Dr. Blanca, please prepare letter.    Thank you.

## 2021-12-07 NOTE — PROGRESS NOTES
Date of Office Visit: 2021  Encounter Provider: Abisai Blanca MD  Place of Service: [facility] CARDIOLOGY  Patient Name: Zachary Zayas  :1945    Chief Complaint   Patient presents with   • NICM     7 month f/u    • persistent AFIB   • frequent PVCs   :     HPI: Zachary Zayas is a 76 y.o. male who presents today for premature ventricular contractions.    No PVCs present today, and the patient does not describe any noticeable palpitations or concerns.  Prior ablation, but PVCs were not completely eliminated.    No heart failure symptoms.    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    His blood pressure is elevated today, which he attributes to stress with parking.  He is not currently checking it routinely at home.  In the past he was, and it was consistently less than 120/80 mmHg.        Past Medical History:   Diagnosis Date   • Allergies    • Arthritis    • Asthma    • Colon polyps    • ED (erectile dysfunction)    • NICM (nonischemic cardiomyopathy) (HCC) 1/15/2020   • PVC's (premature ventricular contractions) 1/15/2020   • Thyroid nodule        Past Surgical History:   Procedure Laterality Date   • CARDIAC CATHETERIZATION N/A 2020    Procedure: Coronary angiography;  Surgeon: Abisai Blanca MD;  Location:  [facility];  Service: Cardiovascular;  Laterality: N/A;   • CARDIAC ELECTROPHYSIOLOGY PROCEDURE N/A 2020    Procedure: Ablation PVC- carto;  Surgeon: Abisai Blanca MD;  Location:  [facility];  Service: Cardiovascular;  Laterality: N/A;   • COLONOSCOPY N/A 3/8/2019    Procedure: COLONOSCOPY with polypectomy;  Surgeon: Fred Fang MD;  Location:  [facility] OR;  Service: Gastroenterology   • HERNIA REPAIR     • NOSE SURGERY     • SHOULDER LIGAMENT REPAIR     • TONSILLECTOMY     • TOTAL HIP ARTHROPLASTY Right        Social History     Socioeconomic History   •  "Marital status:    Tobacco Use   • Smoking status: Never Smoker   • Smokeless tobacco: Never Used   Vaping Use   • Vaping Use: Never used   Substance and Sexual Activity   • Alcohol use: No   • Drug use: No   • Sexual activity: Defer       Family History   Problem Relation Age of Onset   • Heart attack Father 43   • Hypertension Father    • Sudden death Father        Review of Systems   Constitutional: Negative.   Cardiovascular: Negative.    Respiratory: Negative.    Gastrointestinal: Negative.        Allergies   Allergen Reactions   • Influenza Vaccines Other (See Comments)     Declines due to reaction \"3 years in a row.\" States the flu vaccine makes him sick.    • Lactose Intolerance (Gi) GI Intolerance   • Msm [Methylsulfonylmethane] Other (See Comments)     Raises BP         Current Outpatient Medications:   •  acetaminophen (TYLENOL) 500 MG tablet, Take 500 mg by mouth Every 6 (Six) Hours As Needed for Mild Pain ., Disp: , Rfl:   •  Calcium Carb-Cholecalciferol (CALCIUM 600 + D PO), Take 1 tablet by mouth Daily., Disp: , Rfl:   •  cetirizine (zyrTEC) 10 MG tablet, Take 5 mg by mouth As Needed for Allergies., Disp: , Rfl:   •  fluticasone (FLONASE) 50 MCG/ACT nasal spray, 2 sprays into the nostril(s) as directed by provider Daily., Disp: , Rfl:   •  GLUCOSAMINE CHONDROITIN COMPLX PO, Take 2 tablets by mouth Daily., Disp: , Rfl:   •  hypromellose (ARTIFICIAL TEARS) 0.4 % solution, 1 drop As Needed for Dry Eyes., Disp: , Rfl:   •  Misc Natural Products (SAW PALMETTO COMPLEX EX ST PO), Take 2 tablets by mouth Daily., Disp: , Rfl:   •  PATIENT SUPPLIED ALLERGY INJECTION, Inject  under the skin into the appropriate area as directed Every 30 (Thirty) Days., Disp: , Rfl:   •  tadalafil (CIALIS) 10 MG tablet, Take 10 mg by mouth daily as needed for erectile dysfunction., Disp: , Rfl:       Objective:     Vitals:    12/07/21 1018   BP: 146/82   Pulse: (!) 47   Weight: 79.8 kg (176 lb)   Height: 177.8 cm (70\") "     Body mass index is 25.25 kg/m².    PHYSICAL EXAM:    Vitals and nursing note reviewed.   Constitutional:       Appearance: Healthy appearance.   HENT:      Head: Normocephalic.    Mouth/Throat:      Pharynx: Oropharynx is clear.   Pulmonary:      Effort: Pulmonary effort is normal.      Breath sounds: Normal breath sounds.   Cardiovascular:      Normal rate. Regular rhythm.      Murmurs: There is no murmur.   Edema:     Peripheral edema absent.   Skin:     General: Skin is warm.   Neurological:      General: No focal deficit present.      Mental Status: Alert, oriented to person, place, and time and oriented to person, place and time.   Psychiatric:         Attention and Perception: Attention and perception normal.         Mood and Affect: Mood and affect normal.             ECG 12 Lead    Date/Time: 12/7/2021 11:10 AM  Performed by: Abisai Blanca MD  Authorized by: Abisai Blanca MD   Comparison: compared with previous ECG from 4/22/2021  Comparison to previous ECG: PVCs less frequent  Rhythm: sinus bradycardia              Assessment:       Diagnosis Plan   1. PVC's (premature ventricular contractions)     2. NICM (nonischemic cardiomyopathy) (HCC)     3. Primary hypertension            Plan:       Premature ventricular contractions are absent on EKG today.  Patient has no attributable symptoms.  He denies any symptoms of heart failure.  He reports normal physical activity levels.  Blood pressure slightly elevated today.  He is going to check it more frequently at home.  Ragona reduce Coreg to 3.125 mg twice daily.    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    As always, it has been a pleasure to participate in your patient's care.      Sincerely,         Abisai Blanca MD

## 2021-12-09 NOTE — TELEPHONE ENCOUNTER
CALL FROM PATIENT.  SCHEDULED AT [place] ON 05/13/2022 AT 8:45AM - ARRIVE 7:30AM.  WILL MAIL INSTRUCTIONS.    COVID TEST ON 05/11/2022, WILL CALL WITH TIME.  NEED TO SELF QUARANTINE UNTIL AFTER PROCEDURE.  HE UNDERSTANDS.

## 2021-12-13 ENCOUNTER — TELEPHONE (OUTPATIENT)
Dept: INTERNAL MEDICINE | Facility: CLINIC | Age: 76
End: 2021-12-13

## 2021-12-13 DIAGNOSIS — Z20.822 ENCOUNTER FOR SCREENING LABORATORY TESTING FOR COVID-19 VIRUS: Primary | ICD-10-CM

## 2021-12-13 PROBLEM — Z86.0100 PERSONAL HISTORY OF COLONIC POLYPS: Status: ACTIVE | Noted: 2021-12-13

## 2021-12-13 PROBLEM — Z86.010 PERSONAL HISTORY OF COLONIC POLYPS: Status: ACTIVE | Noted: 2021-12-13

## 2021-12-13 NOTE — TELEPHONE ENCOUNTER
Was unable to get in contact with patient, line kept ringing. Hub please inform patient the order for him to get the antibody test is in the chart. Please ask why he is wanting to get his antibodies tested.

## 2021-12-13 NOTE — TELEPHONE ENCOUNTER
Order placed. Please document any vaccines for Covid that he has had and why he is requesting antibody levels.

## 2021-12-16 NOTE — TELEPHONE ENCOUNTER
Letter was placed in mail to patient's home address on 12/9.    As of today, 12/16 he has not received.  He is going to wait another day to see if it comes, if not he would like to  at [place] office.    JP If he comes in, please print letter from Dr. Blanca for patient.     Thank you!

## 2021-12-17 ENCOUNTER — TELEPHONE (OUTPATIENT)
Dept: INTERNAL MEDICINE | Facility: CLINIC | Age: 76
End: 2021-12-17

## 2021-12-17 NOTE — TELEPHONE ENCOUNTER
Caller: Zachary Zayas     Relationship to Patient: SELF    Phone Number: [phone]     Reason for Call: PATIENT WAS WRITTEN A LETTER FOR COMMERCIAL VEHICLE CLEARANCE BY DR. CORONADO. IT WAS GOING TO BE MAILED TO HIS ADDRESS BUT HE HASN'T RECEIVED IT YET AND WAS TOLD THAT THIS OFFICE SHOULD BE ABLE TO ACCESS THAT ON HIS CHART AND PRINT IT FOR HIM. HE WANTED TO SEE IF THIS WOULD BE POSSIBLE.  PLEASE CALL PATIENT TO LET HIM KNOW IF THIS IS FEASIBLE.

## 2021-12-20 RX ORDER — CARVEDILOL 12.5 MG/1
TABLET ORAL
Qty: 60 TABLET | Refills: 0 | Status: SHIPPED | OUTPATIENT
Start: 2021-12-20 | End: 2022-01-03 | Stop reason: ALTCHOICE

## 2021-12-22 ENCOUNTER — TELEPHONE (OUTPATIENT)
Dept: INTERNAL MEDICINE | Facility: CLINIC | Age: 76
End: 2021-12-22

## 2021-12-29 DIAGNOSIS — Z12.5 SCREENING PSA (PROSTATE SPECIFIC ANTIGEN): ICD-10-CM

## 2021-12-29 DIAGNOSIS — E04.1 SOLITARY THYROID NODULE: ICD-10-CM

## 2021-12-29 DIAGNOSIS — I10 PRIMARY HYPERTENSION: Primary | ICD-10-CM

## 2021-12-30 ENCOUNTER — LAB (OUTPATIENT)
Dept: INTERNAL MEDICINE | Facility: CLINIC | Age: 76
End: 2021-12-30

## 2021-12-30 DIAGNOSIS — Z12.5 SCREENING PSA (PROSTATE SPECIFIC ANTIGEN): ICD-10-CM

## 2021-12-30 DIAGNOSIS — I10 PRIMARY HYPERTENSION: ICD-10-CM

## 2021-12-30 DIAGNOSIS — E04.1 SOLITARY THYROID NODULE: ICD-10-CM

## 2021-12-31 LAB
ALBUMIN SERPL-MCNC: 4 G/DL (ref 3.7–4.7)
ALBUMIN/GLOB SERPL: 1.8 {RATIO} (ref 1.2–2.2)
ALP SERPL-CCNC: 77 IU/L (ref 44–121)
ALT SERPL-CCNC: 18 IU/L (ref 0–44)
AST SERPL-CCNC: 22 IU/L (ref 0–40)
BASOPHILS # BLD AUTO: 0.1 X10E3/UL (ref 0–0.2)
BASOPHILS NFR BLD AUTO: 1 %
BILIRUB SERPL-MCNC: 0.7 MG/DL (ref 0–1.2)
BUN SERPL-MCNC: 18 MG/DL (ref 8–27)
BUN/CREAT SERPL: 20 (ref 10–24)
CALCIUM SERPL-MCNC: 9.4 MG/DL (ref 8.6–10.2)
CHLORIDE SERPL-SCNC: 109 MMOL/L (ref 96–106)
CHOLEST SERPL-MCNC: 173 MG/DL (ref 100–199)
CHOLEST/HDLC SERPL: 3 RATIO (ref 0–5)
CO2 SERPL-SCNC: 20 MMOL/L (ref 20–29)
CREAT SERPL-MCNC: 0.91 MG/DL (ref 0.76–1.27)
EOSINOPHIL # BLD AUTO: 0.2 X10E3/UL (ref 0–0.4)
EOSINOPHIL NFR BLD AUTO: 4 %
ERYTHROCYTE [DISTWIDTH] IN BLOOD BY AUTOMATED COUNT: 12.5 % (ref 11.6–15.4)
GLOBULIN SER CALC-MCNC: 2.2 G/DL (ref 1.5–4.5)
GLUCOSE SERPL-MCNC: 90 MG/DL (ref 65–99)
HCT VFR BLD AUTO: 39.5 % (ref 37.5–51)
HDLC SERPL-MCNC: 58 MG/DL
HGB BLD-MCNC: 13.8 G/DL (ref 13–17.7)
IMM GRANULOCYTES # BLD AUTO: 0.1 X10E3/UL (ref 0–0.1)
IMM GRANULOCYTES NFR BLD AUTO: 1 %
LDLC SERPL CALC-MCNC: 105 MG/DL (ref 0–99)
LYMPHOCYTES # BLD AUTO: 1 X10E3/UL (ref 0.7–3.1)
LYMPHOCYTES NFR BLD AUTO: 18 %
MCH RBC QN AUTO: 29.9 PG (ref 26.6–33)
MCHC RBC AUTO-ENTMCNC: 34.9 G/DL (ref 31.5–35.7)
MCV RBC AUTO: 86 FL (ref 79–97)
MONOCYTES # BLD AUTO: 0.5 X10E3/UL (ref 0.1–0.9)
MONOCYTES NFR BLD AUTO: 9 %
NEUTROPHILS # BLD AUTO: 3.6 X10E3/UL (ref 1.4–7)
NEUTROPHILS NFR BLD AUTO: 67 %
PLATELET # BLD AUTO: 167 X10E3/UL (ref 150–450)
POTASSIUM SERPL-SCNC: 4.4 MMOL/L (ref 3.5–5.2)
PROT SERPL-MCNC: 6.2 G/DL (ref 6–8.5)
PSA SERPL-MCNC: 4.6 NG/ML (ref 0–4)
RBC # BLD AUTO: 4.61 X10E6/UL (ref 4.14–5.8)
SODIUM SERPL-SCNC: 142 MMOL/L (ref 134–144)
TRIGL SERPL-MCNC: 51 MG/DL (ref 0–149)
TSH SERPL DL<=0.005 MIU/L-ACNC: 3.95 UIU/ML (ref 0.45–4.5)
VLDLC SERPL CALC-MCNC: 10 MG/DL (ref 5–40)
WBC # BLD AUTO: 5.4 X10E3/UL (ref 3.4–10.8)

## 2022-01-03 ENCOUNTER — OFFICE VISIT (OUTPATIENT)
Dept: INTERNAL MEDICINE | Facility: CLINIC | Age: 77
End: 2022-01-03

## 2022-01-03 VITALS
HEIGHT: 70 IN | RESPIRATION RATE: 16 BRPM | OXYGEN SATURATION: 99 % | WEIGHT: 176 LBS | DIASTOLIC BLOOD PRESSURE: 75 MMHG | HEART RATE: 57 BPM | BODY MASS INDEX: 25.2 KG/M2 | TEMPERATURE: 97.8 F | SYSTOLIC BLOOD PRESSURE: 130 MMHG

## 2022-01-03 DIAGNOSIS — Z00.00 ENCOUNTER FOR SUBSEQUENT ANNUAL WELLNESS VISIT (AWV) IN MEDICARE PATIENT: Primary | ICD-10-CM

## 2022-01-03 LAB
BILIRUB BLD-MCNC: NEGATIVE MG/DL
CLARITY, POC: CLEAR
COLOR UR: YELLOW
EXPIRATION DATE: NORMAL
GLUCOSE UR STRIP-MCNC: NEGATIVE MG/DL
KETONES UR QL: NEGATIVE
LEUKOCYTE EST, POC: NEGATIVE
Lab: NORMAL
NITRITE UR-MCNC: NEGATIVE MG/ML
PH UR: 5.5 [PH] (ref 5–8)
PROT UR STRIP-MCNC: NEGATIVE MG/DL
RBC # UR STRIP: NEGATIVE /UL
SP GR UR: 1.03 (ref 1–1.03)
UROBILINOGEN UR QL: NORMAL

## 2022-01-03 PROCEDURE — 81003 URINALYSIS AUTO W/O SCOPE: CPT | Performed by: NURSE PRACTITIONER

## 2022-01-03 PROCEDURE — G0439 PPPS, SUBSEQ VISIT: HCPCS | Performed by: NURSE PRACTITIONER

## 2022-01-03 PROCEDURE — 1160F RVW MEDS BY RX/DR IN RCRD: CPT | Performed by: NURSE PRACTITIONER

## 2022-01-03 PROCEDURE — 1170F FXNL STATUS ASSESSED: CPT | Performed by: NURSE PRACTITIONER

## 2022-01-03 PROCEDURE — 1126F AMNT PAIN NOTED NONE PRSNT: CPT | Performed by: NURSE PRACTITIONER

## 2022-01-03 NOTE — PATIENT INSTRUCTIONS
"https://www.cdc.gov/vaccines/hcp/vis/vis-statements/tdap.pdf\">   Tdap (Tetanus, Diphtheria, Pertussis) Vaccine: What You Need to Know  1. Why get vaccinated?  Tdap vaccine can prevent tetanus, diphtheria, and pertussis.  Diphtheria and pertussis spread from person to person. Tetanus enters the body through cuts or wounds.  · TETANUS (T) causes painful stiffening of the muscles. Tetanus can lead to serious health problems, including being unable to open the mouth, having trouble swallowing and breathing, or death.  · DIPHTHERIA (D) can lead to difficulty breathing, heart failure, paralysis, or death.  · PERTUSSIS (aP), also known as \"whooping cough,\" can cause uncontrollable, violent coughing which makes it hard to breathe, eat, or drink. Pertussis can be extremely serious in babies and young children, causing pneumonia, convulsions, brain damage, or death. In teens and adults, it can cause weight loss, loss of bladder control, passing out, and rib fractures from severe coughing.  2. Tdap vaccine  Tdap is only for children 7 years and older, adolescents, and adults.   Adolescents should receive a single dose of Tdap, preferably at age 11 or 12 years.  Pregnant women should get a dose of Tdap during every pregnancy, to protect the  from pertussis. Infants are most at risk for severe, life-threatening complications from pertussis.  Adults who have never received Tdap should get a dose of Tdap.  Also, adults should receive a booster dose every 10 years, or earlier in the case of a severe and dirty wound or burn. Booster doses can be either Tdap or Td (a different vaccine that protects against tetanus and diphtheria but not pertussis).  Tdap may be given at the same time as other vaccines.  3. Talk with your health care provider  Tell your vaccine provider if the person getting the vaccine:  · Has had an allergic reaction after a previous dose of any vaccine that protects against tetanus, diphtheria, or pertussis, " or has any severe, life-threatening allergies.  · Has had a coma, decreased level of consciousness, or prolonged seizures within 7 days after a previous dose of any pertussis vaccine (DTP, DTaP, or Tdap).  · Has seizures or another nervous system problem.  · Has ever had Guillain-Barré Syndrome (also called GBS).  · Has had severe pain or swelling after a previous dose of any vaccine that protects against tetanus or diphtheria.  In some cases, your health care provider may decide to postpone Tdap vaccination to a future visit.   People with minor illnesses, such as a cold, may be vaccinated. People who are moderately or severely ill should usually wait until they recover before getting Tdap vaccine.   Your health care provider can give you more information.  4. Risks of a vaccine reaction  · Pain, redness, or swelling where the shot was given, mild fever, headache, feeling tired, and nausea, vomiting, diarrhea, or stomachache sometimes happen after Tdap vaccine.  People sometimes faint after medical procedures, including vaccination. Tell your provider if you feel dizzy or have vision changes or ringing in the ears.   As with any medicine, there is a very remote chance of a vaccine causing a severe allergic reaction, other serious injury, or death.  5. What if there is a serious problem?  An allergic reaction could occur after the vaccinated person leaves the clinic. If you see signs of a severe allergic reaction (hives, swelling of the face and throat, difficulty breathing, a fast heartbeat, dizziness, or weakness), call 9-1-1 and get the person to the nearest hospital.  For other signs that concern you, call your health care provider.   Adverse reactions should be reported to the Vaccine Adverse Event Reporting System (VAERS). Your health care provider will usually file this report, or you can do it yourself. Visit the VAERS website at www.vaers.hhs.gov or call 1-192.377.3909. VAERS is only for reporting  reactions, and [facility] staff do not give medical advice.  6. The National Vaccine Injury Compensation Program  The National Vaccine Injury Compensation Program (VICP) is a federal program that was created to compensate people who may have been injured by certain vaccines. Visit the VICP website at www.[facility].gov/vaccinecompensation or call 1-912.959.1114 to learn about the program and about filing a claim. There is a time limit to file a claim for compensation.  7. How can I learn more?  · Ask your health care provider.  · Call your local or state health department.  · Contact the Centers for Disease Control and Prevention (CDC):  ? Call 1-879.765.8493 (9-601-UCX-INFO) or  ? Visit CDC's website at www.cdc.gov/vaccines  Vaccine Information Statement Tdap (Tetanus, Diphtheria, Pertussis) Vaccine (04/01/2020)  This information is not intended to replace advice given to you by your health care provider. Make sure you discuss any questions you have with your health care provider.  Document Revised: 04/10/2020 Document Reviewed: 04/13/2020  Health Benefits Direct Patient Education © 2021 [employer]  Pneumococcal Vaccine, Polyvalent solution for injection  What is this medicine?  PNEUMOCOCCAL VACCINE, POLYVALENT (MOISÉS mo SURESH al britt ATKINS, justino ene garcia) is a vaccine to prevent pneumococcus bacteria infection. These bacteria are a major cause of ear infections, Strep throat infections, and serious pneumonia, meningitis, or blood infections worldwide. These vaccines help the body to produce antibodies (protective substances) that help your body defend against these bacteria. This vaccine is recommended for people 2 years of age and older with health problems. It is also recommended for all adults over 50 years old. This vaccine will not treat an infection.  This medicine may be used for other purposes; ask your health care provider or pharmacist if you have questions.  COMMON BRAND NAME(S): Pneumovax 23  What should I tell my health care  provider before I take this medicine?  They need to know if you have any of these conditions:  · bleeding problems  · bone marrow or organ transplant  · cancer, Hodgkin's disease  · fever  · infection  · immune system problems  · low platelet count in the blood  · seizures  · an unusual or allergic reaction to pneumococcal vaccine, diphtheria toxoid, other vaccines, latex, other medicines, foods, dyes, or preservatives  · pregnant or trying to get pregnant  · breast-feeding  How should I use this medicine?  This vaccine is for injection into a muscle or under the skin. It is given by a health care professional.  A copy of Vaccine Information Statements will be given before each vaccination. Read this sheet carefully each time. The sheet may change frequently.  Talk to your pediatrician regarding the use of this medicine in children. While this drug may be prescribed for children as young as 2 years of age for selected conditions, precautions do apply.  Overdosage: If you think you have taken too much of this medicine contact a poison control center or emergency room at once.  NOTE: This medicine is only for you. Do not share this medicine with others.  What if I miss a dose?  It is important not to miss your dose. Call your doctor or health care professional if you are unable to keep an appointment.  What may interact with this medicine?  · medicines for cancer chemotherapy  · medicines that suppress your immune function  · medicines that treat or prevent blood clots like warfarin, enoxaparin, and dalteparin  · steroid medicines like prednisone or cortisone  This list may not describe all possible interactions. Give your health care provider a list of all the medicines, herbs, non-prescription drugs, or dietary supplements you use. Also tell them if you smoke, drink alcohol, or use illegal drugs. Some items may interact with your medicine.  What should I watch for while using this medicine?  Mild fever and pain  should go away in 3 days or less. Report any unusual symptoms to your doctor or health care professional.  What side effects may I notice from receiving this medicine?  Side effects that you should report to your doctor or health care professional as soon as possible:  · allergic reactions like skin rash, itching or hives, swelling of the face, lips, or tongue  · breathing problems  · confused  · fever over 102 degrees F  · pain, tingling, numbness in the hands or feet  · seizures  · unusual bleeding or bruising  · unusual muscle weakness  Side effects that usually do not require medical attention (report to your doctor or health care professional if they continue or are bothersome):  · aches and pains  · diarrhea  · fever of 102 degrees F or less  · headache  · irritable  · loss of appetite  · pain, tender at site where injected  · trouble sleeping  This list may not describe all possible side effects. Call your doctor for medical advice about side effects. You may report side effects to FDA at 9-973-EGV-5976.  Where should I keep my medicine?  This does not apply. This vaccine is given in a clinic, pharmacy, doctor's office, or other health care setting and will not be stored at home.  NOTE: This sheet is a summary. It may not cover all possible information. If you have questions about this medicine, talk to your doctor, pharmacist, or health care provider.  © 2021 Elsevier/Gold Standard (2009-07-24 14:32:37)

## 2022-01-03 NOTE — PROGRESS NOTES
The ABCs of the Annual Wellness Visit  Subsequent Medicare Wellness Visit    Chief Complaint   Patient presents with   • Medicare Wellness-subsequent      Subjective    History of Present Illness:  Zachary Zayas is a 76 y.o. male who presents for a Subsequent Medicare Wellness Visit.    [facility]      He sees Dr. Holland (allergy), Dr. Longo (Ortho), [facility] (sppt 1-4-2022), Dr. Fang (GI), and Katherine Owen (cardio).     He was also admitted to [facility] for a cardiac ablation with Dr. Blanca for PVCs. He is on coreg BID for rate/rhythmn control.     The following portions of the patient's history were reviewed and   updated as appropriate: allergies, current medications, past family history, past medical history, past social history, past surgical history and problem list.    Compared to one year ago, the patient feels his physical   health is the same.    Compared to one year ago, the patient feels his mental   health is the same.    Recent Hospitalizations:  He was not admitted to the hospital during the last year.       Current Medical Providers:  Patient Care Team:  Marianne Wasserman APRN as PCP - General (Family Medicine)  Gianni Tidwell MD as Surgeon (Orthopedic Surgery)  Abisai Blanca MD as Consulting Physician (Cardiology)  Abdirizak Holland MD as Consulting Physician (Allergy and Immunology)    Outpatient Medications Prior to Visit   Medication Sig Dispense Refill   • acetaminophen (TYLENOL) 500 MG tablet Take 500 mg by mouth Every 6 (Six) Hours As Needed for Mild Pain .     • Calcium Carb-Cholecalciferol (CALCIUM 600 + D PO) Take 1 tablet by mouth Daily.     • carvedilol (COREG) 12.5 MG tablet TAKE 1 TABLET BY MOUTH EVERY 12 HOURS 60 tablet 0   • carvedilol (COREG) 3.125 MG tablet Take 1 tablet by mouth 2 (Two) Times a Day. 180 tablet 3   • cetirizine (zyrTEC) 10 MG tablet Take 5 mg by mouth As Needed for Allergies.     • fluticasone (FLONASE) 50  MCG/ACT nasal spray 2 sprays into the nostril(s) as directed by provider Daily.     • GLUCOSAMINE CHONDROITIN COMPLX PO Take 2 tablets by mouth Daily.     • hypromellose (ARTIFICIAL TEARS) 0.4 % solution 1 drop As Needed for Dry Eyes.     • Misc Natural Products (SAW PALMETTO COMPLEX EX ST PO) Take 2 tablets by mouth Daily.     • PATIENT SUPPLIED ALLERGY INJECTION Inject  under the skin into the appropriate area as directed Every 30 (Thirty) Days.     • tadalafil (CIALIS) 10 MG tablet Take 10 mg by mouth daily as needed for erectile dysfunction.       No facility-administered medications prior to visit.       No opioid medication identified on active medication list. I have reviewed chart for other potential  high risk medication/s and harmful drug interactions in the elderly.          Aspirin is not on active medication list.  Aspirin use is not indicated based on review of current medical condition/s. Risk of harm outweighs potential benefits.  .    Patient Active Problem List   Diagnosis   • Benign non-nodular prostatic hyperplasia without lower urinary tract symptoms   • Chronic fatigue   • Solitary thyroid nodule   • Erectile dysfunction   • S/P hip replacement   • Tubular adenoma   • Actinic keratosis   • Adenomatous polyp of colon   • Medicare annual wellness visit, initial   • Bilateral hearing loss   • Varicose veins of left leg with edema   • Premature ventricular contractions (PVCs) (VPCs)   • Sinus arrhythmia   • PVC's (premature ventricular contractions)   • NICM (nonischemic cardiomyopathy) (HCC)   • PVC (premature ventricular contraction)   • Primary osteoarthritis of left hip   • Elevated PSA   • Primary hypertension   • Personal history of colonic polyps     Advance Care Planning  Advance Directive is not on file.  ACP discussion was held with the patient during this visit. Patient has an advance directive (not in EMR), copy requested.          Objective    Vitals:    01/03/22 0808   BP: 130/75  "  Pulse: 57   Resp: 16   Temp: 97.8 °F (36.6 °C)   SpO2: 99%   Weight: 79.8 kg (176 lb)   Height: 177.8 cm (70\")   PainSc: 0-No pain     BMI Readings from Last 1 Encounters:   01/03/22 25.25 kg/m²   BMI is above normal parameters. Recommendations include: exercise counseling    Does the patient have evidence of cognitive impairment? No    Physical Exam  Vitals reviewed.   Constitutional:       Appearance: Normal appearance. He is not ill-appearing.   Cardiovascular:      Rate and Rhythm: Normal rate and regular rhythm.      Pulses: Normal pulses.      Heart sounds: Normal heart sounds. No murmur heard.      Pulmonary:      Effort: Pulmonary effort is normal. No respiratory distress.      Breath sounds: Normal breath sounds.   Neurological:      Mental Status: He is alert.       Lab Results   Component Value Date    CHLPL 173 12/30/2021    TRIG 51 12/30/2021    HDL 58 12/30/2021     (H) 12/30/2021    VLDL 10 12/30/2021            HEALTH RISK ASSESSMENT    Smoking Status:  Social History     Tobacco Use   Smoking Status Never Smoker   Smokeless Tobacco Never Used     Alcohol Consumption:  Social History     Substance and Sexual Activity   Alcohol Use No     Fall Risk Screen:    STEADI Fall Risk Assessment was completed, and patient is at LOW risk for falls.Assessment completed on:1/3/2022    Depression Screening:  PHQ-2/PHQ-9 Depression Screening 1/3/2022   Little interest or pleasure in doing things 0   Feeling down, depressed, or hopeless 0   Trouble falling or staying asleep, or sleeping too much -   Feeling tired or having little energy -   Poor appetite or overeating -   Feeling bad about yourself - or that you are a failure or have let yourself or your family down -   Trouble concentrating on things, such as reading the newspaper or watching television -   Moving or speaking so slowly that other people could have noticed. Or the opposite - being so fidgety or restless that you have been moving around a " lot more than usual -   Thoughts that you would be better off dead, or of hurting yourself in some way -   Total Score 0   If you checked off any problems, how difficult have these problems made it for you to do your work, take care of things at home, or get along with other people? -       Health Habits and Functional and Cognitive Screening:  Functional & Cognitive Status 1/3/2022   Do you have difficulty preparing food and eating? No   Do you have difficulty bathing yourself, getting dressed or grooming yourself? No   Do you have difficulty using the toilet? No   Do you have difficulty moving around from place to place? No   Do you have trouble with steps or getting out of a bed or a chair? No   Current Diet Well Balanced Diet   Dental Exam Up to date   Eye Exam Up to date   Exercise (times per week) 4 times per week   Current Exercises Include Walking   Current Exercise Activities Include -   Do you need help using the phone?  No   Are you deaf or do you have serious difficulty hearing?  No   Do you need help with transportation? No   Do you need help shopping? No   Do you need help preparing meals?  No   Do you need help with housework?  No   Do you need help with laundry? No   Do you need help taking your medications? No   Do you need help managing money? No   Do you ever drive or ride in a car without wearing a seat belt? No   Have you felt unusual stress, anger or loneliness in the last month? -   Who do you live with? -   If you need help, do you have trouble finding someone available to you? -   Have you been bothered in the last four weeks by sexual problems? -   Do you have difficulty concentrating, remembering or making decisions? -       Age-appropriate Screening Schedule:  Refer to the list below for future screening recommendations based on patient's age, sex and/or medical conditions. Orders for these recommended tests are listed in the plan section. The patient has been provided with a written  plan.    Health Maintenance   Topic Date Due   • TDAP/TD VACCINES (1 - Tdap) Never done   • ZOSTER VACCINE (1 of 2) Never done   • LIPID PANEL  12/30/2022              Assessment/Plan   CMS Preventative Services Quick Reference  Risk Factors Identified During Encounter  Hearing Problem  Immunizations Discussed/Encouraged (specific Immunizations; Tdap, Pneumococcal 23 and COVID19  The above risks/problems have been discussed with the patient.  Follow up actions/plans if indicated are seen below in the Assessment/Plan Section.  Pertinent information has been shared with the patient in the After Visit Summary.    Diagnoses and all orders for this visit:    1. Healthcare maintenance (Primary)  -     POCT urinalysis dipstick, automated    [facility] [place]     Follow Up:    1 year  An After Visit Summary and PPPS were made available to the patient.

## 2022-01-04 LAB
SARS-COV-2 AB SERPL IA-ACNC: 668.2 U/ML
SARS-COV-2 AB SERPL-IMP: POSITIVE

## 2022-04-01 ENCOUNTER — TELEPHONE (OUTPATIENT)
Dept: GASTROENTEROLOGY | Facility: CLINIC | Age: 77
End: 2022-04-01

## 2022-04-01 NOTE — TELEPHONE ENCOUNTER
PATIENT CALLED TO RESCHEDULE HIS COLONOSCOPY 05/13/2022.     EXPLAINED ABOUT DR. SHAH STARTING 06/2022.  DOES NOT WANT TO WAIT UNTIL 09/2022 FOR PROCEDURE.    WILL SCHEDULE WITH DR. SHAH.  07/07/2022.  WILL CALL WITH MORE INFORMATION ONCE I GET THE GREEN LIGHT TO SCHEDULE.  HE UNDERSTANDS.

## 2022-04-15 NOTE — TELEPHONE ENCOUNTER
SPOKE WITH PATIENT.  SCHEDULED AT [place] WITH DR. SHAH ON 07/06/2022 AT 7:30PM - ARRIVE 6:30AM.  DOES NOT NEED NEW INSTRUCTIONS.  HE CORRECTED HIS COPY.    NO COVID TEST REQUIRED.    NEED NEW ORDER FROM DR. SHAH.

## 2022-06-02 ENCOUNTER — PREP FOR SURGERY (OUTPATIENT)
Dept: OTHER | Facility: HOSPITAL | Age: 77
End: 2022-06-02

## 2022-06-02 DIAGNOSIS — Z86.010 PERSONAL HISTORY OF COLONIC POLYPS: Primary | ICD-10-CM

## 2022-06-17 ENCOUNTER — TELEPHONE (OUTPATIENT)
Dept: GASTROENTEROLOGY | Facility: CLINIC | Age: 77
End: 2022-06-17

## 2022-06-17 NOTE — TELEPHONE ENCOUNTER
Spoke with patient.  Scheduled for colonoscopy on 07/05/2022 with Dr. Spann.  Need to reschedule.    Scheduled at [place] with Dr. Spann 07/20/2022 at 7:30am, arrive 6:30a.  Prep instructions stay the same expect for the date.

## 2022-07-19 ENCOUNTER — ANESTHESIA EVENT (OUTPATIENT)
Dept: PERIOP | Facility: HOSPITAL | Age: 77
End: 2022-07-19

## 2022-07-20 ENCOUNTER — ANESTHESIA (OUTPATIENT)
Dept: PERIOP | Facility: HOSPITAL | Age: 77
End: 2022-07-20

## 2022-07-20 ENCOUNTER — HOSPITAL ENCOUNTER (OUTPATIENT)
Facility: HOSPITAL | Age: 77
Setting detail: HOSPITAL OUTPATIENT SURGERY
Discharge: HOME | End: 2022-07-20
Attending: INTERNAL MEDICINE | Admitting: INTERNAL MEDICINE

## 2022-07-20 VITALS
RESPIRATION RATE: 16 BRPM | SYSTOLIC BLOOD PRESSURE: 118 MMHG | DIASTOLIC BLOOD PRESSURE: 86 MMHG | OXYGEN SATURATION: 97 % | BODY MASS INDEX: 24.62 KG/M2 | TEMPERATURE: 97.7 F | WEIGHT: 171.6 LBS | HEART RATE: 60 BPM

## 2022-07-20 DIAGNOSIS — Z86.010 PERSONAL HISTORY OF COLONIC POLYPS: ICD-10-CM

## 2022-07-20 PROCEDURE — 88305 TISSUE EXAM BY PATHOLOGIST: CPT | Performed by: INTERNAL MEDICINE

## 2022-07-20 PROCEDURE — 45385 COLONOSCOPY W/LESION REMOVAL: CPT | Performed by: INTERNAL MEDICINE

## 2022-07-20 PROCEDURE — 25010000002 PROPOFOL 10 MG/ML EMULSION: Performed by: NURSE ANESTHETIST, CERTIFIED REGISTERED

## 2022-07-20 RX ORDER — SODIUM CHLORIDE, SODIUM LACTATE, POTASSIUM CHLORIDE, CALCIUM CHLORIDE 600; 310; 30; 20 MG/100ML; MG/100ML; MG/100ML; MG/100ML
100 INJECTION, SOLUTION INTRAVENOUS CONTINUOUS
Status: DISCONTINUED | OUTPATIENT
Start: 2022-07-20 | End: 2022-07-20 | Stop reason: HOSPADM

## 2022-07-20 RX ORDER — SODIUM CHLORIDE 9 MG/ML
40 INJECTION, SOLUTION INTRAVENOUS AS NEEDED
Status: DISCONTINUED | OUTPATIENT
Start: 2022-07-20 | End: 2022-07-20 | Stop reason: HOSPADM

## 2022-07-20 RX ORDER — LIDOCAINE HYDROCHLORIDE 20 MG/ML
INJECTION, SOLUTION INFILTRATION; PERINEURAL AS NEEDED
Status: DISCONTINUED | OUTPATIENT
Start: 2022-07-20 | End: 2022-07-20 | Stop reason: SURG

## 2022-07-20 RX ORDER — SODIUM CHLORIDE 0.9 % (FLUSH) 0.9 %
10 SYRINGE (ML) INJECTION EVERY 12 HOURS SCHEDULED
Status: DISCONTINUED | OUTPATIENT
Start: 2022-07-20 | End: 2022-07-20 | Stop reason: HOSPADM

## 2022-07-20 RX ORDER — MAGNESIUM HYDROXIDE 1200 MG/15ML
LIQUID ORAL AS NEEDED
Status: DISCONTINUED | OUTPATIENT
Start: 2022-07-20 | End: 2022-07-20 | Stop reason: HOSPADM

## 2022-07-20 RX ORDER — SODIUM CHLORIDE, SODIUM LACTATE, POTASSIUM CHLORIDE, CALCIUM CHLORIDE 600; 310; 30; 20 MG/100ML; MG/100ML; MG/100ML; MG/100ML
9 INJECTION, SOLUTION INTRAVENOUS CONTINUOUS PRN
Status: DISCONTINUED | OUTPATIENT
Start: 2022-07-20 | End: 2022-07-20 | Stop reason: HOSPADM

## 2022-07-20 RX ORDER — LIDOCAINE HYDROCHLORIDE 10 MG/ML
0.5 INJECTION, SOLUTION EPIDURAL; INFILTRATION; INTRACAUDAL; PERINEURAL ONCE AS NEEDED
Status: DISCONTINUED | OUTPATIENT
Start: 2022-07-20 | End: 2022-07-20 | Stop reason: HOSPADM

## 2022-07-20 RX ORDER — SODIUM CHLORIDE 0.9 % (FLUSH) 0.9 %
10 SYRINGE (ML) INJECTION AS NEEDED
Status: DISCONTINUED | OUTPATIENT
Start: 2022-07-20 | End: 2022-07-20 | Stop reason: HOSPADM

## 2022-07-20 RX ORDER — ONDANSETRON 2 MG/ML
4 INJECTION INTRAMUSCULAR; INTRAVENOUS ONCE AS NEEDED
Status: DISCONTINUED | OUTPATIENT
Start: 2022-07-20 | End: 2022-07-20 | Stop reason: HOSPADM

## 2022-07-20 RX ORDER — PROPOFOL 10 MG/ML
VIAL (ML) INTRAVENOUS AS NEEDED
Status: DISCONTINUED | OUTPATIENT
Start: 2022-07-20 | End: 2022-07-20 | Stop reason: SURG

## 2022-07-20 RX ADMIN — PROPOFOL 25 MG: 10 INJECTION, EMULSION INTRAVENOUS at 07:37

## 2022-07-20 RX ADMIN — PROPOFOL 50 MG: 10 INJECTION, EMULSION INTRAVENOUS at 07:57

## 2022-07-20 RX ADMIN — SODIUM CHLORIDE, POTASSIUM CHLORIDE, SODIUM LACTATE AND CALCIUM CHLORIDE 9 ML/HR: 600; 310; 30; 20 INJECTION, SOLUTION INTRAVENOUS at 07:05

## 2022-07-20 RX ADMIN — PROPOFOL 50 MG: 10 INJECTION, EMULSION INTRAVENOUS at 07:53

## 2022-07-20 RX ADMIN — PROPOFOL 50 MG: 10 INJECTION, EMULSION INTRAVENOUS at 07:48

## 2022-07-20 RX ADMIN — PROPOFOL 50 MG: 10 INJECTION, EMULSION INTRAVENOUS at 07:43

## 2022-07-20 RX ADMIN — LIDOCAINE HYDROCHLORIDE 100 MG: 20 INJECTION, SOLUTION INFILTRATION; PERINEURAL at 07:32

## 2022-07-20 RX ADMIN — PROPOFOL 75 MG: 10 INJECTION, EMULSION INTRAVENOUS at 07:35

## 2022-07-20 NOTE — ANESTHESIA POSTPROCEDURE EVALUATION
Patient: Zachary Zayas    Procedure Summary     Date: 07/20/22 Room / Location: [facility] ENDOSCOPY 2 /  [facility]    Anesthesia Start: 0730 Anesthesia Stop: 0801    Procedure: COLONOSCOPY WITH POLYPECTOMY (N/A ) Diagnosis:       Personal history of colonic polyps      (Personal history of colonic polyps [Z86.010])    Surgeons: Roberto Spann MD Provider: Earlene Espinoza CRNA    Anesthesia Type: MAC ASA Status: 2          Anesthesia Type: MAC    Vitals  Vitals Value Taken Time   BP     Temp 97.7 °F (36.5 °C) 07/20/22 0807   Pulse     Resp     SpO2             Post Anesthesia Care and Evaluation    Patient location during evaluation: bedside  Patient participation: complete - patient participated  Level of consciousness: awake and alert  Pain score: 0  Pain management: adequate    Airway patency: patent  Anesthetic complications: No anesthetic complications  PONV Status: none  Cardiovascular status: acceptable  Respiratory status: acceptable  Hydration status: acceptable

## 2022-07-20 NOTE — H&P
Patient Care Team:  Marianne Wasserman APRN as PCP - General (Family Medicine)  Gianni Tidwell MD as Surgeon (Orthopedic Surgery)  Abisai Blanca MD as Consulting Physician (Cardiology)  Abdirizak Holland MD as Consulting Physician (Allergy and Immunology)    CHIEF COMPLAINT: Personal history of colon polyps    HISTORY OF PRESENT ILLNESS:    Patient is a semiretired 77-year-old  who has presented for surveillance colonoscopy he has a history of removal of multiple colonic polyps in the past.      Past Medical History:   Diagnosis Date   • Allergies    • Arthritis    • Asthma    • Colon polyps    • ED (erectile dysfunction)    • NICM (nonischemic cardiomyopathy) (HCC) 1/15/2020   • PVC's (premature ventricular contractions) 1/15/2020   • Thyroid nodule      Past Surgical History:   Procedure Laterality Date   • CARDIAC CATHETERIZATION N/A 2/17/2020    Procedure: Coronary angiography;  Surgeon: Abisai Blanca MD;  Location:  [facility];  Service: Cardiovascular;  Laterality: N/A;   • CARDIAC ELECTROPHYSIOLOGY PROCEDURE N/A 2/17/2020    Procedure: Ablation PVC- carto;  Surgeon: Abisai Blanca MD;  Location:  [facility];  Service: Cardiovascular;  Laterality: N/A;   • COLONOSCOPY N/A 3/8/2019    Procedure: COLONOSCOPY with polypectomy;  Surgeon: Fred Fang MD;  Location: [facility] OR;  Service: Gastroenterology   • HERNIA REPAIR     • NOSE SURGERY     • SHOULDER LIGAMENT REPAIR     • TONSILLECTOMY     • TOTAL HIP ARTHROPLASTY Right 2000     Family History   Problem Relation Age of Onset   • Heart attack Father 43   • Hypertension Father    • Sudden death Father      Social History     Tobacco Use   • Smoking status: Never Smoker   • Smokeless tobacco: Never Used   Vaping Use   • Vaping Use: Never used   Substance Use Topics   • Alcohol use: No   • Drug use: No     Medications Prior to Admission   Medication Sig Dispense  Refill Last Dose   • carvedilol (COREG) 3.125 MG tablet Take 1 tablet by mouth 2 (Two) Times a Day. 180 tablet 3 7/19/2022 at Unknown time   • cetirizine (zyrTEC) 10 MG tablet Take 5 mg by mouth As Needed for Allergies.   Past Week at Unknown time   • GLUCOSAMINE CHONDROITIN COMPLX PO Take 2 tablets by mouth Daily.   7/16/2022 at Unknown time   • hypromellose (NATURES TEARS) 0.4 % solution 1 drop As Needed for Dry Eyes.   7/19/2022 at Unknown time   • PATIENT SUPPLIED ALLERGY INJECTION Inject  under the skin into the appropriate area as directed Every 30 (Thirty) Days.   Past Month at Unknown time   • acetaminophen (TYLENOL) 500 MG tablet Take 500 mg by mouth Every 6 (Six) Hours As Needed for Mild Pain .   7/13/2022   • Calcium Carb-Cholecalciferol (CALCIUM 600 + D PO) Take 1 tablet by mouth Daily.   7/16/2022   • fluticasone (FLONASE) 50 MCG/ACT nasal spray 2 sprays into the nostril(s) as directed by provider Daily.   More than a month at Unknown time   • Misc Natural Products (SAW PALMETTO COMPLEX EX ST PO) Take 2 tablets by mouth Daily.   7/16/2022   • tadalafil (CIALIS) 10 MG tablet Take 10 mg by mouth daily as needed for erectile dysfunction.   7/14/2022     Allergies:  Influenza vaccines, Lactose intolerance (gi), and Msm [methylsulfonylmethane]    REVIEW OF SYSTEMS:  Please see the above history of present illness for pertinent positives and negatives.  The remainder of the patient's systems have been reviewed and are negative.  Patient denies any overt GI bleed or any colorectal symptoms.    Vital Signs  Temp:  [97.8 °F (36.6 °C)] 97.8 °F (36.6 °C)  Heart Rate:  [64] 64  Resp:  [16] 16  BP: (125)/(78) 125/78    Flowsheet Rows    Flowsheet Row First Filed Value   Admission Height --   Admission Weight 77.1 kg (170 lb) Documented at 07/19/2022 1132           Physical Exam:  Physical Exam   Constitutional: Patient appears well-developed and well-nourished and in no acute distress   HEENT:   Head: Normocephalic  and atraumatic.   Eyes:  Pupils are equal, round, and reactive to light. EOM are intact. Sclera are anicteric and non-injected.  Mouth and Throat: Patient has moist mucous membranes. Oropharynx is clear of any erythema or exudate.     Neck: Neck supple. No JVD present. No thyromegaly present. No lymphadenopathy present.  Cardiovascular: Regular rate, regular rhythm, S1 normal and S2 normal.  Exam reveals no gallop and no friction rub.  No murmur heard.  Pulmonary/Chest: Lungs are clear to auscultation bilaterally. No respiratory distress. No wheezes. No rhonchi. No rales.   Abdominal: Soft. Bowel sounds are normal. No distension and no mass. There is no hepatosplenomegaly. There is no tenderness.   Musculoskeletal: Normal Muscle tone  Extremities: No edema. Pulses are palpable in all 4 extremities.  Neurological: Patient is alert and oriented to person, place, and time. Cranial nerves II-XII are grossly intact with no focal deficits.  Skin: Skin is warm. No rash noted. Nails show no clubbing.  No cyanosis or erythema.    Debilities/Disabilities Identified: None  Emotional Behavior: Appropriate     Results Review:    I reviewed the patient's new clinical results.  Lab Results (most recent)     None          Imaging Results (Most Recent)     None        not reviewed    ECG/EMG Results (most recent)     None        not reviewed    Assessment & Plan     Patient with a personal history of colonic adenomas presenting for surveillance colonoscopy.  The pros and cons of the procedure and potential risks and complications were discussed with the patient and his family and they were reassured.    I discussed the patients findings and my recommendations with patient and family..     Roberto Spann MD  07/20/22  07:24 EDT

## 2022-07-20 NOTE — BRIEF OP NOTE
COLONOSCOPY WITH POLYPECTOMY  Progress Note    Zachary Zayas  7/20/2022    Pre-op Diagnosis:   Personal history of colonic polyps [Z86.010]       Post-Op Diagnosis Codes:     * Personal history of colonic polyps [Z86.010]    Procedure/CPT® Codes:        Procedure(s):  COLONOSCOPY WITH POLYPECTOMY    Surgeon(s):  Roberto Spann MD    Anesthesia: Monitored Anesthesia Care    Staff:   Circulator: Joi Hadley RN  Scrub Person: Dee Dee Gamez         Estimated Blood Loss: minimal    Urine Voided: * No values recorded between 7/20/2022  7:30 AM and 7/20/2022  7:59 AM *    Specimens:                Specimens     ID Source Type Tests Collected By Collected At Frozen?    A Large Intestine, Cecum Polyp · TISSUE PATHOLOGY EXAM   Roberto Spann MD 7/20/22 4818     Description: cecal polyps    This specimen was not marked as sent.    B Large Intestine, Transverse Colon Polyp · TISSUE PATHOLOGY EXAM   Roberto Spann MD 7/20/22 5524     Description: transverse colon polyps    This specimen was not marked as sent.                Drains: * No LDAs found *    Findings:   1.  Multiple diminutive polyps in the cecum a total of 5-6 polyps in the cecal pouch were removed using cold snare polypectomy these ranged in size from 3 to 5 mm each    2.  Two small sessile polyps in the transverse colon removed using cold snare polypectomy.  Polyps from both sites were retrieved and sent for histology    3.  Moderately severe localized sigmoid and descending colon diverticulosis    4.  Rest of the examination up to cecum was normal with the good prep and visualization.        Complications: None          Roberto Spann MD     Date: 7/20/2022  Time: 08:05 EDT

## 2022-07-20 NOTE — ANESTHESIA PREPROCEDURE EVALUATION
Anesthesia Evaluation     Patient summary reviewed and Nursing notes reviewed   no history of anesthetic complications:  NPO Solid Status: > 8 hours  NPO Liquid Status: > 8 hours           Airway   Mallampati: II  TM distance: >3 FB  Neck ROM: full  No difficulty expected  Dental      Pulmonary     breath sounds clear to auscultation  (+) asthma (pt states he thinks it's allergies ),  Cardiovascular   Exercise tolerance: good (4-7 METS)    Patient on routine beta blocker and Beta blocker given within 24 hours of surgery  Rhythm: regular  Rate: normal    (+) hypertension well controlled less than 2 medications, dysrhythmias (pt has had an ablation, states it didn't work was placed on betablocker ) PVC, PVD (swelling left LE, pt wears compression socks ),       Neuro/Psych- negative ROS  GI/Hepatic/Renal/Endo    (+)   hepatitis (liver lesion ), renal disease stones, thyroid problem thyroid nodules    Musculoskeletal     Abdominal    Substance History - negative use     OB/GYN          Other   arthritis,                      Anesthesia Plan    ASA 2     MAC     intravenous induction     Anesthetic plan, risks, benefits, and alternatives have been provided, discussed and informed consent has been obtained with: patient.  Use of blood products discussed with patient  Consented to blood products.       CODE STATUS:

## 2022-07-21 LAB
LAB AP CASE REPORT: NORMAL
PATH REPORT.FINAL DX SPEC: NORMAL
PATH REPORT.GROSS SPEC: NORMAL

## 2022-12-08 ENCOUNTER — OFFICE VISIT (OUTPATIENT)
Dept: CARDIOLOGY | Facility: CLINIC | Age: 77
End: 2022-12-08

## 2022-12-08 VITALS
BODY MASS INDEX: 25.05 KG/M2 | HEIGHT: 70 IN | DIASTOLIC BLOOD PRESSURE: 92 MMHG | HEART RATE: 54 BPM | SYSTOLIC BLOOD PRESSURE: 134 MMHG | WEIGHT: 175 LBS

## 2022-12-08 DIAGNOSIS — I42.8 NICM (NONISCHEMIC CARDIOMYOPATHY): Chronic | ICD-10-CM

## 2022-12-08 DIAGNOSIS — I49.3 PREMATURE VENTRICULAR CONTRACTIONS (PVCS) (VPCS): Primary | Chronic | ICD-10-CM

## 2022-12-08 PROCEDURE — 99213 OFFICE O/P EST LOW 20 MIN: CPT

## 2022-12-08 PROCEDURE — 93000 ELECTROCARDIOGRAM COMPLETE: CPT

## 2022-12-08 NOTE — PROGRESS NOTES
Date of Office Visit: 2022  Encounter Provider: JESSE Holland  Place of Service: [facility] CARDIOLOGY  Patient Name: Zachary Zayas  :1945    Chief Complaint   Patient presents with   • Irregular Heart Beat     1 year follow up / PVC's   :     HPI: Zachary Zayas is a 77 y.o. male who is followed by Dr. Blanac for premature ventricular contractions.      He has history of PVCs which were discovered on monitor back in . He had associated NICM with EF of 41%. He underwent ablation of left ventricular summit PVC.  Since the procedure he has done well, really no palpitations.     He saw Dr. Blanca last year. Was not having any PVCs during that visit and had no complaints. No HF symptoms. BP was slightly elevated.                           He presents today for follow up appt.     Since the ablation he says that he has done very well.      He really has not had any recurrence of PVCs that he has noted.     EKG today shows NSR.      He denies any chest pain, dyspnea, palpitations, edema, or fatigue.    EF has since recovered. No HF symptoms.     He remains on carvedilol    He stopped drinking coffee which he wonders, if this helped          Past Medical History:   Diagnosis Date   • Allergies    • Arthritis    • Asthma    • Colon polyps    • ED (erectile dysfunction)    • NICM (nonischemic cardiomyopathy) ([facility]) 1/15/2020   • PVC's (premature ventricular contractions) 1/15/2020   • Thyroid nodule        Past Surgical History:   Procedure Laterality Date   • CARDIAC CATHETERIZATION N/A 2020    Procedure: Coronary angiography;  Surgeon: Abisai Blanca MD;  Location: [facility] CATH INVASIVE LOCATION;  Service: Cardiovascular;  Laterality: N/A;   • CARDIAC ELECTROPHYSIOLOGY PROCEDURE N/A 2020    Procedure: Ablation PVC- carto;  Surgeon: Abisai Blanca MD;  Location: [facility] CATH INVASIVE LOCATION;  Service: Cardiovascular;  Laterality: N/A;   •  "COLONOSCOPY N/A 3/8/2019    Procedure: COLONOSCOPY with polypectomy;  Surgeon: Fred Fang MD;  Location:  [facility] OR;  Service: Gastroenterology   • COLONOSCOPY W/ POLYPECTOMY N/A 7/20/2022    Procedure: COLONOSCOPY WITH POLYPECTOMY;  Surgeon: Roberto Spann MD;  Location:  [facility] OR;  Service: Gastroenterology;  Laterality: N/A;  diverticulosis  cecal polyps  transverse colon polyps (cold snare)     • HERNIA REPAIR     • NOSE SURGERY     • SHOULDER LIGAMENT REPAIR     • TONSILLECTOMY     • TOTAL HIP ARTHROPLASTY Right 2000       Social History     Socioeconomic History   • Marital status:    Tobacco Use   • Smoking status: Never   • Smokeless tobacco: Never   Vaping Use   • Vaping Use: Never used   Substance and Sexual Activity   • Alcohol use: No   • Drug use: No   • Sexual activity: Defer       Family History   Problem Relation Age of Onset   • Heart attack Father 43   • Hypertension Father    • Sudden death Father        Review of Systems   Constitutional: Negative for chills, fever and malaise/fatigue.   Cardiovascular: Negative for chest pain, dyspnea on exertion, leg swelling, near-syncope, orthopnea, palpitations, paroxysmal nocturnal dyspnea and syncope.   Respiratory: Negative for cough and shortness of breath.    Hematologic/Lymphatic: Negative.    Musculoskeletal: Negative for joint pain, joint swelling and myalgias.   Gastrointestinal: Negative for abdominal pain, diarrhea, melena, nausea and vomiting.   Genitourinary: Negative for frequency and hematuria.   Neurological: Negative for light-headedness, numbness, paresthesias and seizures.   Allergic/Immunologic: Negative.    All other systems reviewed and are negative.      Allergies   Allergen Reactions   • Influenza Vaccines Other (See Comments)     Declines due to reaction \"3 years in a row.\" States the flu vaccine makes him sick.    • Lactose Intolerance (Gi) GI Intolerance   • Msm [Methylsulfonylmethane] Other (See Comments)     " "Raises BP         Current Outpatient Medications:   •  acetaminophen (TYLENOL) 500 MG tablet, Take 500 mg by mouth Every 6 (Six) Hours As Needed for Mild Pain ., Disp: , Rfl:   •  Calcium Carb-Cholecalciferol (CALCIUM 600 + D PO), Take 1 tablet by mouth Daily., Disp: , Rfl:   •  carvedilol (COREG) 3.125 MG tablet, Take 1 tablet by mouth 2 (Two) Times a Day., Disp: 180 tablet, Rfl: 3  •  cetirizine (zyrTEC) 10 MG tablet, Take 5 mg by mouth As Needed for Allergies., Disp: , Rfl:   •  fluticasone (FLONASE) 50 MCG/ACT nasal spray, 2 sprays into the nostril(s) as directed by provider Daily., Disp: , Rfl:   •  GLUCOSAMINE CHONDROITIN COMPLX PO, Take 2 tablets by mouth Daily., Disp: , Rfl:   •  hypromellose (NATURES TEARS) 0.4 % solution, 1 drop As Needed for Dry Eyes., Disp: , Rfl:   •  Misc Natural Products (SAW PALMETTO COMPLEX EX ST PO), Take 2 tablets by mouth Daily., Disp: , Rfl:   •  PATIENT SUPPLIED ALLERGY INJECTION, Inject  under the skin into the appropriate area as directed Every 30 (Thirty) Days., Disp: , Rfl:   •  tadalafil (CIALIS) 10 MG tablet, Take 10 mg by mouth daily as needed for erectile dysfunction., Disp: , Rfl:       Objective:     Vitals:    12/08/22 1140   BP: 134/92   Pulse: 54   Weight: 79.4 kg (175 lb)   Height: 177.8 cm (70\")     Body mass index is 25.11 kg/m².    PHYSICAL EXAM:    Vitals Reviewed.   General Appearance: No acute distress, well developed and well nourished.   Eyes: Conjunctiva and lids: No erythema, swelling, or discharge. Sclera non-icteric.   HENT: Atraumatic, normocephalic. External eyes, ears, and nose normal.   Respiratory: No signs of respiratory distress. Respiration rhythm and depth normal.   Clear to auscultation. No rales, crackles, rhonchi, or wheezing auscultated.   Cardiovascular:  Heart Rate and Rhythm: Normal, Heart Sounds: Normal S1 and S2. No S3 or S4 noted.  Gastrointestinal:  Abdomen soft, non-distended, non-tender.   Musculoskeletal: Normal movement of " extremities  Skin: Warm and dry.   Psychiatric: Patient alert and oriented to person, place, and time. Speech and behavior appropriate. Normal mood and affect.       ECG 12 Lead    Date/Time: 12/8/2022 3:25 PM  Performed by: Maximus Schwarz APRN  Authorized by: Maximus Schwarz APRN   Comparison: compared with previous ECG   Similar to previous ECG  Rhythm: sinus rhythm  BPM: 54                Assessment:       Diagnosis Plan   1. Premature ventricular contractions (PVCs) (VPCs)        2. NICM (nonischemic cardiomyopathy) (HCC)               Plan:   1. PVC--s/p ablation (2020)--- no clinical recurrence of PVC. He feels better and does not notice any palpitations or HF symptoms. Remains on carvedilol. Not on any AAD. EKG today shows NSR.     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 Sent him with letter today.       2. NICM-- no HF symptoms, currently on BB. EF has improved since ablation.         He will follow up in one year or sooner.               As always, it has been a pleasure to participate in your patient's care.      Sincerely,         JESSE Holland

## 2022-12-27 ENCOUNTER — TELEPHONE (OUTPATIENT)
Dept: INTERNAL MEDICINE | Facility: CLINIC | Age: 77
End: 2022-12-27

## 2022-12-27 DIAGNOSIS — Z12.5 SCREENING PSA (PROSTATE SPECIFIC ANTIGEN): ICD-10-CM

## 2022-12-27 DIAGNOSIS — Z11.52 ENCOUNTER FOR SCREENING FOR COVID-19: ICD-10-CM

## 2022-12-27 DIAGNOSIS — Z00.00 HEALTHCARE MAINTENANCE: Primary | ICD-10-CM

## 2022-12-27 NOTE — TELEPHONE ENCOUNTER
PATIENT IS SCHEDULED FOR LABS ON 01/03/23 AND WOULD LIKE TO KNOW IF [facility] COULD ALSO CHECK HIS COVID ANTIBODY LEVELS.    HE ALSO NOTED THAT HIS UROLOGIST, DR. QUEZADA, USES THE LABS THAT [facility] DRAWS - HE WOULD LIKE TO MAKE SURE THAT HIS PSA IS TESTED.    PLEASE ADVISE  [phone]

## 2023-01-10 ENCOUNTER — OFFICE VISIT (OUTPATIENT)
Dept: INTERNAL MEDICINE | Facility: CLINIC | Age: 78
End: 2023-01-10
Payer: MEDICARE

## 2023-01-10 VITALS
HEART RATE: 64 BPM | OXYGEN SATURATION: 100 % | WEIGHT: 178.2 LBS | DIASTOLIC BLOOD PRESSURE: 64 MMHG | HEIGHT: 70 IN | BODY MASS INDEX: 25.51 KG/M2 | SYSTOLIC BLOOD PRESSURE: 120 MMHG | TEMPERATURE: 98 F

## 2023-01-10 DIAGNOSIS — Z00.00 ENCOUNTER FOR SUBSEQUENT ANNUAL WELLNESS VISIT (AWV) IN MEDICARE PATIENT: Primary | ICD-10-CM

## 2023-01-10 PROCEDURE — 1159F MED LIST DOCD IN RCRD: CPT | Performed by: NURSE PRACTITIONER

## 2023-01-10 PROCEDURE — 1125F AMNT PAIN NOTED PAIN PRSNT: CPT | Performed by: NURSE PRACTITIONER

## 2023-01-10 PROCEDURE — 1170F FXNL STATUS ASSESSED: CPT | Performed by: NURSE PRACTITIONER

## 2023-01-10 PROCEDURE — G0439 PPPS, SUBSEQ VISIT: HCPCS | Performed by: NURSE PRACTITIONER

## 2023-01-10 PROCEDURE — 1126F AMNT PAIN NOTED NONE PRSNT: CPT | Performed by: NURSE PRACTITIONER

## 2023-01-10 PROCEDURE — 1160F RVW MEDS BY RX/DR IN RCRD: CPT | Performed by: NURSE PRACTITIONER

## 2023-01-10 RX ORDER — WHEAT DEXTRIN/ASPARTAME 3 G/6 G
1 POWDER IN PACKET (EA) ORAL DAILY
Qty: 38 EACH | Refills: 0
Start: 2023-01-10

## 2023-01-10 NOTE — PROGRESS NOTES
The ABCs of the Annual Wellness Visit  Subsequent Medicare Wellness Visit    Subjective      Zachary Zayas is a 77 y.o. male who presents for a Subsequent Medicare Wellness Visit.    [facility].     He sees Dr. Holland (allergy), Dr. Longo (Ortho), [facility], Dr. Fang (GI), and Dr. Blanca. (cardio).     He is followed by Dr. Blanca, and JESSE Holland for cardiology.  From HPI:     \"He has history of PVCs which were discovered on monitor back in 2020. He had associated NICM with EF of 41%. He underwent ablation of left ventricular summit PVC.  Since the procedure he has done well, really no palpitations. \"     He is on IT once a month with allergy.    He goes to [employer] with cycling or skiing 3-4 times a week. .     The following portions of the patient's history were reviewed and   updated as appropriate: allergies, current medications, past family history, past medical history, past social history, past surgical history and problem list.    Compared to one year ago, the patient feels his physical   health is the same.    Compared to one year ago, the patient feels his mental   health is the same.    Recent Hospitalizations:  He was not admitted to the hospital during the last year.       Current Medical Providers:  Patient Care Team:  Marianne Wasserman APRN as PCP - General (Family Medicine)  Gianni Tidwell MD as Surgeon (Orthopedic Surgery)  Abisai Blanca MD as Consulting Physician (Cardiology)  Abdirizak Holland MD as Consulting Physician (Allergy and Immunology)    Outpatient Medications Prior to Visit   Medication Sig Dispense Refill   • acetaminophen (TYLENOL) 500 MG tablet Take 500 mg by mouth Every 6 (Six) Hours As Needed for Mild Pain .     • Calcium Carb-Cholecalciferol (CALCIUM 600 + D PO) Take 1 tablet by mouth Daily.     • carvedilol (COREG) 3.125 MG tablet Take 1 tablet by mouth 2 (Two) Times a Day. 180 tablet 3   • cetirizine  (zyrTEC) 10 MG tablet Take 5 mg by mouth As Needed for Allergies.     • fluticasone (FLONASE) 50 MCG/ACT nasal spray 2 sprays into the nostril(s) as directed by provider Daily.     • GLUCOSAMINE CHONDROITIN COMPLX PO Take 2 tablets by mouth Daily.     • hypromellose (NATURES TEARS) 0.4 % solution 1 drop As Needed for Dry Eyes.     • Misc Natural Products (SAW PALMETTO COMPLEX EX ST PO) Take 2 tablets by mouth Daily.     • PATIENT SUPPLIED ALLERGY INJECTION Inject  under the skin into the appropriate area as directed Every 30 (Thirty) Days.     • tadalafil (CIALIS) 10 MG tablet Take 10 mg by mouth daily as needed for erectile dysfunction.       No facility-administered medications prior to visit.       No opioid medication identified on active medication list. I have reviewed chart for other potential  high risk medication/s and harmful drug interactions in the elderly.          Aspirin is not on active medication list.  determined by cardiology.    Patient Active Problem List   Diagnosis   • Benign non-nodular prostatic hyperplasia without lower urinary tract symptoms   • Chronic fatigue   • Solitary thyroid nodule   • Erectile dysfunction   • S/P hip replacement   • Tubular adenoma   • Actinic keratosis   • Adenomatous polyp of colon   • Encounter for subsequent annual wellness visit (AWV) in Medicare patient   • Bilateral hearing loss   • Varicose veins of left leg with edema   • Premature ventricular contractions (PVCs) (VPCs)   • Sinus arrhythmia   • PVC's (premature ventricular contractions)   • NICM (nonischemic cardiomyopathy) (HCC)   • PVC (premature ventricular contraction)   • Primary osteoarthritis of left hip   • Elevated PSA   • Primary hypertension   • Personal history of colonic polyps     Advance Care Planning  Advance Directive is not on file.  ACP discussion was held with the patient during this visit. Patient does not have an advance directive, information provided. He reports \"my wife will take  care of this\"     Objective    Vitals:    01/10/23 0902   BP: 120/64   BP Location: Left arm   Pulse: 64   Temp: 98 °F (36.7 °C)   TempSrc: Infrared   SpO2: 100%   Weight: 80.8 kg (178 lb 3.2 oz)   Height: 177.8 cm (70\")     Estimated body mass index is 25.57 kg/m² as calculated from the following:    Height as of this encounter: 177.8 cm (70\").    Weight as of this encounter: 80.8 kg (178 lb 3.2 oz).    BMI is >= 25 and <30. (Overweight) The following options were offered after discussion;: exercise counseling/recommendations      Does the patient have evidence of cognitive impairment?   No    Lab Results   Component Value Date    CHLPL 176 01/03/2023    TRIG 52 01/03/2023    HDL 57 01/03/2023     (H) 01/03/2023    VLDL 10 01/03/2023          HEALTH RISK ASSESSMENT    Smoking Status:  Social History     Tobacco Use   Smoking Status Never   Smokeless Tobacco Never     Alcohol Consumption:  Social History     Substance and Sexual Activity   Alcohol Use No     Fall Risk Screen:    STEADI Fall Risk Assessment was completed, and patient is at LOW risk for falls.Assessment completed on:1/10/2023    Depression Screening:  PHQ-2/PHQ-9 Depression Screening 1/10/2023   Little Interest or Pleasure in Doing Things 0-->not at all   Feeling Down, Depressed or Hopeless 0-->not at all   PHQ-9: Brief Depression Severity Measure Score 0       Health Habits and Functional and Cognitive Screening:  Functional & Cognitive Status 1/10/2023   Do you have difficulty preparing food and eating? No   Do you have difficulty bathing yourself, getting dressed or grooming yourself? No   Do you have difficulty using the toilet? No   Do you have difficulty moving around from place to place? No   Do you have trouble with steps or getting out of a bed or a chair? No   Current Diet Well Balanced Diet   Dental Exam Up to date   Eye Exam Up to date   Exercise (times per week) 4 times per week   Current Exercises Include Stationary Bicycling/Spin  Class   Current Exercise Activities Include -   Do you need help using the phone?  No   Are you deaf or do you have serious difficulty hearing?  No   Do you need help with transportation? No   Do you need help shopping? No   Do you need help preparing meals?  No   Do you need help with housework?  No   Do you need help with laundry? No   Do you need help taking your medications? No   Do you need help managing money? No   Do you ever drive or ride in a car without wearing a seat belt? -   Have you felt unusual stress, anger or loneliness in the last month? No   Who do you live with? Spouse   If you need help, do you have trouble finding someone available to you? No   Have you been bothered in the last four weeks by sexual problems? No   Do you have difficulty concentrating, remembering or making decisions? Yes       Age-appropriate Screening Schedule:  Refer to the list below for future screening recommendations based on patient's age, sex and/or medical conditions. Orders for these recommended tests are listed in the plan section. The patient has been provided with a written plan.    Health Maintenance   Topic Date Due   • TDAP/TD VACCINES (1 - Tdap) Never done   • ZOSTER VACCINE (1 of 2) Never done   • LIPID PANEL  01/03/2024                CMS Preventative Services Quick Reference  Risk Factors Identified During Encounter:    Immunizations Discussed/Encouraged: Tdap, Influenza, Pneumococcal 23, Prevnar 20 (Pneumococcal 20-valent conjugate), Shingrix and COVID19    The above risks/problems have been discussed with the patient.  Pertinent information has been shared with the patient in the After Visit Summary.    ,   Diagnosis Plan   1. Encounter for subsequent annual wellness visit (AWV) in Medicare patient            Follow Up:     Next Medicare Wellness visit to be scheduled in 1 year.      An After Visit Summary and PPPS were made available to the patient.

## 2023-01-17 RX ORDER — CARVEDILOL 3.12 MG/1
3.12 TABLET ORAL 2 TIMES DAILY
Qty: 180 TABLET | Refills: 3 | Status: SHIPPED | OUTPATIENT
Start: 2023-01-17

## 2023-02-22 ENCOUNTER — TELEPHONE (OUTPATIENT)
Dept: CARDIOLOGY | Facility: CLINIC | Age: 78
End: 2023-02-22

## 2023-02-22 NOTE — TELEPHONE ENCOUNTER
Caller: Zachary Zayas    Relationship: Self    Best call back number: [phone]    What is the best time to reach you: ANY      What was the call regarding: [facility] S[facility] CARDIAC CLEARANCE NEEDS TO BE FILLED OUT     Do you require a callback: YES

## 2023-02-23 ENCOUNTER — TELEPHONE (OUTPATIENT)
Dept: CARDIOLOGY | Facility: CLINIC | Age: 78
End: 2023-02-23
Payer: MEDICARE

## 2023-02-23 NOTE — TELEPHONE ENCOUNTER
Pt is scheduled 3/21 to have a hip with [facility]. They are asking if pt can move forward with procedure. Pt not on A/C. No history of stroke or valve replacement...Rachel

## 2023-11-03 ENCOUNTER — TELEPHONE (OUTPATIENT)
Dept: CARDIOLOGY | Facility: CLINIC | Age: 78
End: 2023-11-03
Payer: MEDICARE

## 2023-11-03 NOTE — TELEPHONE ENCOUNTER
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 DR GOLDMAN IS A DR HE HAS SEEN IN THE PAST ANS WOULD LIKE TO GO BACK TO SEEING HIM IN [place] OFFICE.     Additional notes: PATIENT HAS AN UPCOMING APPT ON 12/12/23 CAN IT BE CHANGED TO DR GOLDMAN IN [place].

## 2023-12-13 ENCOUNTER — OFFICE VISIT (OUTPATIENT)
Dept: CARDIOLOGY | Facility: CLINIC | Age: 78
End: 2023-12-13
Payer: MEDICARE

## 2023-12-13 VITALS
SYSTOLIC BLOOD PRESSURE: 128 MMHG | DIASTOLIC BLOOD PRESSURE: 82 MMHG | BODY MASS INDEX: 24.91 KG/M2 | HEART RATE: 61 BPM | WEIGHT: 174 LBS | HEIGHT: 70 IN | OXYGEN SATURATION: 100 %

## 2023-12-13 DIAGNOSIS — I49.3 PREMATURE VENTRICULAR CONTRACTIONS (PVCS) (VPCS): Chronic | ICD-10-CM

## 2023-12-13 DIAGNOSIS — I42.8 NICM (NONISCHEMIC CARDIOMYOPATHY): Primary | Chronic | ICD-10-CM

## 2023-12-13 DIAGNOSIS — Z98.890 H/O CARDIAC RADIOFREQUENCY ABLATION: ICD-10-CM

## 2023-12-13 PROCEDURE — 99214 OFFICE O/P EST MOD 30 MIN: CPT | Performed by: INTERNAL MEDICINE

## 2023-12-13 PROCEDURE — 1160F RVW MEDS BY RX/DR IN RCRD: CPT | Performed by: INTERNAL MEDICINE

## 2023-12-13 PROCEDURE — 1159F MED LIST DOCD IN RCRD: CPT | Performed by: INTERNAL MEDICINE

## 2023-12-13 PROCEDURE — 93000 ELECTROCARDIOGRAM COMPLETE: CPT | Performed by: INTERNAL MEDICINE

## 2023-12-13 PROCEDURE — 3074F SYST BP LT 130 MM HG: CPT | Performed by: INTERNAL MEDICINE

## 2023-12-13 PROCEDURE — 3079F DIAST BP 80-89 MM HG: CPT | Performed by: INTERNAL MEDICINE

## 2023-12-13 NOTE — PROGRESS NOTES
Subjective:     Encounter Date:12/13/23      Patient ID: Zachary Zayas is a 78 y.o. male.    Chief Complaint: PVCs  History of Present Illness    Dear Marianne,    I had the pleasure of seeing this patient in the office today for f/u.  He comes in today for follow-up on his cardiomyopathy, history of PVCs status post ablation of left ventricular summit PVCs.    He was in to see the EP clinic a year ago, he comes in today for follow-up.  No complaints of palpitations or tachycardia.    Echocardiogram in 2020 showed ejection fraction 41%, he has not had an assessment of ejection fraction since.    He denies any chest pain, pressure, tightness, squeezing, or heartburn.  He has not experienced any feeling of palpitations, tachycardia or heart racing and no presyncope or syncope.  There has not been any problems with dizziness or lightheadedness.  There has not been any orthopnea or PND, and no problems with lower extremity edema.  He denies any shortness of breath at rest or with activity and has not had any wheezing.  He  has continued to perform daily activities of living without any specific problem or change in the level of activity.      The following portions of the patient's history were reviewed and updated as appropriate: allergies, current medications, past family history, past medical history, past social history, past surgical history and problem list.    Past Medical History:   Diagnosis Date    Allergies     Arthritis     Asthma     Colon polyps     ED (erectile dysfunction)     NICM (nonischemic cardiomyopathy) 1/15/2020    PVC's (premature ventricular contractions) 1/15/2020    Thyroid nodule        Past Surgical History:   Procedure Laterality Date    CARDIAC CATHETERIZATION N/A 2/17/2020    Procedure: Coronary angiography;  Surgeon: Abisai Blanca MD;  Location: [facility] INVASIVE LOCATION;  Service: Cardiovascular;  Laterality: N/A;    CARDIAC ELECTROPHYSIOLOGY PROCEDURE N/A 2/17/2020     "Procedure: Ablation PVC- carto;  Surgeon: Abisai Blanca MD;  Location:  [facility] INVASIVE LOCATION;  Service: Cardiovascular;  Laterality: N/A;    COLONOSCOPY N/A 3/8/2019    Procedure: COLONOSCOPY with polypectomy;  Surgeon: Fred Fang MD;  Location: [facility] ED OR;  Service: Gastroenterology    COLONOSCOPY W/ POLYPECTOMY N/A 7/20/2022    Procedure: COLONOSCOPY WITH POLYPECTOMY;  Surgeon: Roberto Spann MD;  Location:  [facility] OR;  Service: Gastroenterology;  Laterality: N/A;  diverticulosis  cecal polyps  transverse colon polyps (cold snare)      HERNIA REPAIR      NOSE SURGERY      SHOULDER LIGAMENT REPAIR      TONSILLECTOMY      TOTAL HIP ARTHROPLASTY Right 2000               ECG 12 Lead    Date/Time: 12/13/2023 9:49 AM  Performed by: Sher Camacho III, MD    Authorized by: Sher Camacho III, MD  Comparison: compared with previous ECG   Similar to previous ECG  Rhythm: sinus rhythm  Rate: normal  Conduction: conduction normal  ST Segments: ST segments normal  T Waves: T waves normal  QRS axis: normal  Other: no other findings    Clinical impression: normal ECG             Objective:     Vitals:    12/13/23 0926   BP: 128/82   BP Location: Left arm   Patient Position: Sitting   Cuff Size: Adult   Pulse: 61   SpO2: 100%   Weight: 78.9 kg (174 lb)   Height: 177.8 cm (70\")           Physical Exam  Constitutional:       General: He is not in acute distress.     Appearance: He is well-developed. He is not diaphoretic.   HENT:      Head: Normocephalic and atraumatic.      Nose: Nose normal.   Eyes:      General:         Right eye: No discharge.         Left eye: No discharge.      Conjunctiva/sclera: Conjunctivae normal.      Pupils: Pupils are equal, round, and reactive to light.   Neck:      Thyroid: No thyromegaly.      Trachea: No tracheal deviation.   Cardiovascular:      Rate and Rhythm: Normal rate and regular rhythm.      Pulses: Normal pulses.      Heart sounds: Normal heart sounds, S1 " normal and S2 normal.      No S3 sounds.   Pulmonary:      Effort: Pulmonary effort is normal. No respiratory distress.      Breath sounds: Normal breath sounds. No stridor.   Chest:      Chest wall: No tenderness.   Abdominal:      General: Bowel sounds are normal. There is no distension.      Palpations: Abdomen is soft. There is no mass.      Tenderness: There is no abdominal tenderness. There is no guarding or rebound.   Musculoskeletal:         General: No tenderness or deformity. Normal range of motion.      Cervical back: Normal range of motion and neck supple.   Lymphadenopathy:      Cervical: No cervical adenopathy.   Skin:     General: Skin is warm and dry.      Findings: No erythema or rash.   Neurological:      Mental Status: He is alert and oriented to person, place, and time.      Deep Tendon Reflexes: Reflexes are normal and symmetric.   Psychiatric:         Thought Content: Thought content normal.         Lab Review:           Results for orders placed during the hospital encounter of 01/08/20    Adult Stress Echo W/ Cont or Stress Agent if Necessary Per Protocol    Interpretation Summary  · The parasternal images are nondiagnostic. The apical images are adequate in quality. There is abnormal septal motion due to the presence of ventricular ectopy. The apical images at peak stress are grossly normal.  · Baseline ECG of normal sinus rhythm noted. A short run of atrial tachycardia, ~ 120 bpm, was noted prior to exercise. PACs, PVCs and non-specific ST-T wave changes noted.  · Stress ECG was interpretable and is consistent with a normal stress ECG.  · PVC frequency decreased with exercise  · Left ventricular systolic function is mildly decreased. Calculated EF = 41.0%. Estimated EF was in agreement with the calculated EF. Normal left ventricular cavity size noted. There is left ventricular global hypokinesis noted. Left ventricular wall thickness is consistent with borderline concentric hypertrophy.  Left ventricular diastolic function is normal.      Lab Results   Component Value Date    GLUCOSE 89 01/03/2023    BUN 19 03/07/2023    CREATININE 0.84 03/07/2023    EGFRRESULT 90.0 01/03/2023    BCR 22.6 03/07/2023    K 5.2 (H) 03/07/2023    CO2 25 03/07/2023    CALCIUM 9.6 03/07/2023    PROTENTOTREF 6.0 01/03/2023    ALBUMIN 4.1 01/03/2023    BILITOT 0.6 01/03/2023    AST 19 01/03/2023    ALT 20 01/03/2023             Assessment:          Diagnosis Plan   1. NICM (nonischemic cardiomyopathy)  ECG 12 Lead    Adult Transthoracic Echo Complete W/ Cont if Necessary Per Protocol      2. Premature ventricular contractions (PVCs) (VPCs)  ECG 12 Lead    Adult Transthoracic Echo Complete W/ Cont if Necessary Per Protocol      3. H/O cardiac radiofrequency ablation  ECG 12 Lead    Adult Transthoracic Echo Complete W/ Cont if Necessary Per Protocol             Plan:       1.  Nonischemic cardiomyopathy-ejection fraction 41% on the stress echocardiogram in 2020, we will repeat an echocardiogram  2.  Frequent PVCs-status post ablation in 2020, no evidence of recurrent significant ventricular ectopy.    Thank you very much for allowing us to participate in the care of this pleasant patient.  Please don't hesitate to call if I can be of assistance in any way.      Current Outpatient Medications:     acetaminophen (TYLENOL) 500 MG tablet, Take 1 tablet by mouth Every 6 (Six) Hours As Needed for Mild Pain., Disp: , Rfl:     Calcium Carb-Cholecalciferol (CALCIUM 600 + D PO), Take 1 tablet by mouth Daily., Disp: , Rfl:     carvedilol (COREG) 3.125 MG tablet, Take 1 tablet by mouth 2 (Two) Times a Day., Disp: 180 tablet, Rfl: 3    cetirizine (zyrTEC) 10 MG tablet, Take 0.5 tablets by mouth As Needed for Allergies., Disp: , Rfl:     fluticasone (FLONASE) 50 MCG/ACT nasal spray, 2 sprays into the nostril(s) as directed by provider Daily., Disp: , Rfl:     GLUCOSAMINE CHONDROITIN COMPLX PO, Take 2 tablets by mouth Daily., Disp: , Rfl:      hypromellose (NATURES TEARS) 0.4 % solution, 1 drop As Needed for Dry Eyes., Disp: , Rfl:     Misc Natural Products (SAW PALMETTO COMPLEX EX ST PO), Take 2 tablets by mouth Daily., Disp: , Rfl:     PATIENT SUPPLIED ALLERGY INJECTION, Inject  under the skin into the appropriate area as directed Every 30 (Thirty) Days., Disp: , Rfl:     tadalafil (CIALIS) 10 MG tablet, Take 1 tablet by mouth Daily As Needed for Erectile Dysfunction., Disp: , Rfl:     Wheat Dextrin (Benefiber Drink Mix) pack, Take 1 each by mouth Daily., Disp: 38 each, Rfl: 0         Return in about 1 year (around 12/13/2024).

## 2023-12-19 ENCOUNTER — HOSPITAL ENCOUNTER (OUTPATIENT)
Dept: CARDIOLOGY | Facility: HOSPITAL | Age: 78
Discharge: HOME | End: 2023-12-19
Admitting: INTERNAL MEDICINE
Payer: MEDICARE

## 2023-12-19 VITALS
WEIGHT: 174 LBS | HEIGHT: 70 IN | DIASTOLIC BLOOD PRESSURE: 71 MMHG | SYSTOLIC BLOOD PRESSURE: 133 MMHG | HEART RATE: 54 BPM | BODY MASS INDEX: 24.91 KG/M2

## 2023-12-19 DIAGNOSIS — I42.8 NICM (NONISCHEMIC CARDIOMYOPATHY): Chronic | ICD-10-CM

## 2023-12-19 DIAGNOSIS — Z98.890 H/O CARDIAC RADIOFREQUENCY ABLATION: ICD-10-CM

## 2023-12-19 DIAGNOSIS — I49.3 PREMATURE VENTRICULAR CONTRACTIONS (PVCS) (VPCS): Chronic | ICD-10-CM

## 2023-12-19 PROCEDURE — 93306 TTE W/DOPPLER COMPLETE: CPT

## 2023-12-20 LAB
AORTIC DIMENSIONLESS INDEX: 0.5 (DI)
ASCENDING AORTA: 3.5 CM
BH CV ECHO MEAS - ACS: 2.2 CM
BH CV ECHO MEAS - AI P1/2T: 527.7 MSEC
BH CV ECHO MEAS - AO MAX PG: 7.8 MMHG
BH CV ECHO MEAS - AO MEAN PG: 4.1 MMHG
BH CV ECHO MEAS - AO ROOT DIAM: 2.7 CM
BH CV ECHO MEAS - AO V2 MAX: 139.9 CM/SEC
BH CV ECHO MEAS - AO V2 VTI: 32.3 CM
BH CV ECHO MEAS - AVA(I,D): 2 CM2
BH CV ECHO MEAS - EDV(CUBED): 114.2 ML
BH CV ECHO MEAS - EDV(MOD-SP2): 102 ML
BH CV ECHO MEAS - EDV(MOD-SP4): 116 ML
BH CV ECHO MEAS - EF(MOD-BP): 60.3 %
BH CV ECHO MEAS - EF(MOD-SP2): 61.8 %
BH CV ECHO MEAS - EF(MOD-SP4): 60.3 %
BH CV ECHO MEAS - ESV(CUBED): 28.8 ML
BH CV ECHO MEAS - ESV(MOD-SP2): 39 ML
BH CV ECHO MEAS - ESV(MOD-SP4): 46 ML
BH CV ECHO MEAS - FS: 36.8 %
BH CV ECHO MEAS - IVS/LVPW: 1.67 CM
BH CV ECHO MEAS - IVSD: 1.83 CM
BH CV ECHO MEAS - LA DIMENSION: 3.8 CM
BH CV ECHO MEAS - LAT PEAK E' VEL: 11.5 CM/SEC
BH CV ECHO MEAS - LV DIASTOLIC VOL/BSA (35-75): 59 CM2
BH CV ECHO MEAS - LV MASS(C)D: 297.5 GRAMS
BH CV ECHO MEAS - LV MAX PG: 2.7 MMHG
BH CV ECHO MEAS - LV MEAN PG: 1.29 MMHG
BH CV ECHO MEAS - LV SYSTOLIC VOL/BSA (12-30): 23.4 CM2
BH CV ECHO MEAS - LV V1 MAX: 82.3 CM/SEC
BH CV ECHO MEAS - LV V1 VTI: 17.1 CM
BH CV ECHO MEAS - LVIDD: 4.9 CM
BH CV ECHO MEAS - LVIDS: 3.1 CM
BH CV ECHO MEAS - LVOT AREA: 3.8 CM2
BH CV ECHO MEAS - LVOT DIAM: 2.19 CM
BH CV ECHO MEAS - LVPWD: 1.1 CM
BH CV ECHO MEAS - MED PEAK E' VEL: 9.2 CM/SEC
BH CV ECHO MEAS - MR MAX PG: 70.8 MMHG
BH CV ECHO MEAS - MR MAX VEL: 420.7 CM/SEC
BH CV ECHO MEAS - MV A DUR: 0.18 SEC
BH CV ECHO MEAS - MV A MAX VEL: 68.7 CM/SEC
BH CV ECHO MEAS - MV DEC SLOPE: 217.3 CM/SEC2
BH CV ECHO MEAS - MV DEC TIME: 0.25 SEC
BH CV ECHO MEAS - MV E MAX VEL: 64.5 CM/SEC
BH CV ECHO MEAS - MV E/A: 0.94
BH CV ECHO MEAS - MV MAX PG: 2.14 MMHG
BH CV ECHO MEAS - MV MEAN PG: 0.81 MMHG
BH CV ECHO MEAS - MV P1/2T: 98.6 MSEC
BH CV ECHO MEAS - MV V2 VTI: 25.1 CM
BH CV ECHO MEAS - MVA(P1/2T): 2.23 CM2
BH CV ECHO MEAS - MVA(VTI): 2.6 CM2
BH CV ECHO MEAS - PA ACC TIME: 0.15 SEC
BH CV ECHO MEAS - PA V2 MAX: 85 CM/SEC
BH CV ECHO MEAS - PI END-D VEL: 80.2 CM/SEC
BH CV ECHO MEAS - PULM A REVS DUR: 0.18 SEC
BH CV ECHO MEAS - PULM A REVS VEL: 33.5 CM/SEC
BH CV ECHO MEAS - PULM DIAS VEL: 48.3 CM/SEC
BH CV ECHO MEAS - PULM S/D: 1.07
BH CV ECHO MEAS - PULM SYS VEL: 51.6 CM/SEC
BH CV ECHO MEAS - QP/QS: 2.37
BH CV ECHO MEAS - RAP SYSTOLE: 3 MMHG
BH CV ECHO MEAS - RV MAX PG: 1.5 MMHG
BH CV ECHO MEAS - RV V1 MAX: 61.3 CM/SEC
BH CV ECHO MEAS - RV V1 VTI: 16.3 CM
BH CV ECHO MEAS - RVDD: 3.2 CM
BH CV ECHO MEAS - RVOT DIAM: 3.5 CM
BH CV ECHO MEAS - RVSP: 23.5 MMHG
BH CV ECHO MEAS - SI(MOD-SP2): 32 ML/M2
BH CV ECHO MEAS - SI(MOD-SP4): 35.6 ML/M2
BH CV ECHO MEAS - SV(LVOT): 64.7 ML
BH CV ECHO MEAS - SV(MOD-SP2): 63 ML
BH CV ECHO MEAS - SV(MOD-SP4): 70 ML
BH CV ECHO MEAS - SV(RVOT): 153.3 ML
BH CV ECHO MEAS - TAPSE (>1.6): 2.7 CM
BH CV ECHO MEAS - TR MAX PG: 20.5 MMHG
BH CV ECHO MEAS - TR MAX VEL: 226.2 CM/SEC
BH CV ECHO MEASUREMENTS AVERAGE E/E' RATIO: 6.23
BH CV XLRA - RV BASE: 3.6 CM
BH CV XLRA - RV LENGTH: 3.1 CM
BH CV XLRA - RV MID: 6.9 CM
BH CV XLRA - TDI S': 23.3 CM/SEC
LEFT ATRIUM VOLUME INDEX: 34.7 ML/M2
SINUS: 2.7 CM
STJ: 2.8 CM

## 2023-12-20 NOTE — PROGRESS NOTES
Reviewed results and recommendations with Zachary Zayas.  Patient verbalized understanding of results and recommendations.    Thank you,  Mariella FUENTES RN  Triage Nurse [facility]   16:13 EST

## 2024-01-03 ENCOUNTER — TELEPHONE (OUTPATIENT)
Dept: INTERNAL MEDICINE | Facility: CLINIC | Age: 79
End: 2024-01-03
Payer: MEDICARE

## 2024-01-05 DIAGNOSIS — E78.5 HYPERLIPIDEMIA LDL GOAL <100: ICD-10-CM

## 2024-01-05 DIAGNOSIS — Z13.220 SCREENING, LIPID: ICD-10-CM

## 2024-01-05 DIAGNOSIS — Z12.5 SCREENING FOR PROSTATE CANCER: ICD-10-CM

## 2024-01-05 DIAGNOSIS — E04.1 SOLITARY THYROID NODULE: ICD-10-CM

## 2024-01-05 DIAGNOSIS — Z98.890 H/O CARDIAC RADIOFREQUENCY ABLATION: ICD-10-CM

## 2024-01-05 DIAGNOSIS — I42.8 NICM (NONISCHEMIC CARDIOMYOPATHY): Primary | Chronic | ICD-10-CM

## 2024-01-08 RX ORDER — CARVEDILOL 3.12 MG/1
3.12 TABLET ORAL 2 TIMES DAILY
Qty: 180 TABLET | Refills: 3 | Status: SHIPPED | OUTPATIENT
Start: 2024-01-08

## 2024-01-17 ENCOUNTER — OFFICE VISIT (OUTPATIENT)
Dept: INTERNAL MEDICINE | Facility: CLINIC | Age: 79
End: 2024-01-17
Payer: MEDICARE

## 2024-01-17 VITALS
SYSTOLIC BLOOD PRESSURE: 130 MMHG | WEIGHT: 176.6 LBS | OXYGEN SATURATION: 100 % | TEMPERATURE: 98 F | HEART RATE: 71 BPM | DIASTOLIC BLOOD PRESSURE: 72 MMHG | BODY MASS INDEX: 25.28 KG/M2 | HEIGHT: 70 IN

## 2024-01-17 DIAGNOSIS — Z00.00 ENCOUNTER FOR ANNUAL WELLNESS VISIT (AWV) IN MEDICARE PATIENT: Primary | ICD-10-CM

## 2024-01-17 PROCEDURE — 3078F DIAST BP <80 MM HG: CPT | Performed by: NURSE PRACTITIONER

## 2024-01-17 PROCEDURE — G0439 PPPS, SUBSEQ VISIT: HCPCS | Performed by: NURSE PRACTITIONER

## 2024-01-17 PROCEDURE — 3075F SYST BP GE 130 - 139MM HG: CPT | Performed by: NURSE PRACTITIONER

## 2024-01-17 PROCEDURE — 1170F FXNL STATUS ASSESSED: CPT | Performed by: NURSE PRACTITIONER

## 2024-01-17 NOTE — PROGRESS NOTES
The ABCs of the Annual Wellness Visit  Subsequent Medicare Wellness Visit    Subjective      Zachary Zayas is a 78 y.o. male who presents for a Subsequent Medicare Wellness Visit.    The following portions of the patient's history were reviewed and   updated as appropriate: allergies, current medications, past family history, past medical history, past social history, past surgical history, and problem list.    Compared to one year ago, the patient feels his physical   health is the same.    Compared to one year ago, the patient feels his mental   health is the same.    Recent Hospitalizations:  He was not admitted to the hospital during the last year.       Current Medical Providers:  Patient Care Team:  Marianne Wasserman APRN as PCP - General (Family Medicine)  Gianni Tidwell MD as Surgeon (Orthopedic Surgery)  Abisai Blanca MD as Consulting Physician (Cardiology)  Abdirizak Holland MD as Consulting Physician (Allergy and Immunology)    Outpatient Medications Prior to Visit   Medication Sig Dispense Refill    acetaminophen (TYLENOL) 500 MG tablet Take 1 tablet by mouth Every 6 (Six) Hours As Needed for Mild Pain.      Calcium Carb-Cholecalciferol (CALCIUM 600 + D PO) Take 1 tablet by mouth Daily.      carvedilol (COREG) 3.125 MG tablet Take 1 tablet by mouth twice daily 180 tablet 3    cetirizine (zyrTEC) 10 MG tablet Take 0.5 tablets by mouth As Needed for Allergies.      fluticasone (FLONASE) 50 MCG/ACT nasal spray 2 sprays into the nostril(s) as directed by provider Daily.      GLUCOSAMINE CHONDROITIN COMPLX PO Take 2 tablets by mouth Daily.      hypromellose (NATURES TEARS) 0.4 % solution 1 drop As Needed for Dry Eyes.      Misc Natural Products (SAW PALMETTO COMPLEX EX ST PO) Take 2 tablets by mouth Daily.      PATIENT SUPPLIED ALLERGY INJECTION Inject  under the skin into the appropriate area as directed Every 30 (Thirty) Days.      tadalafil (CIALIS) 10 MG tablet Take 1 tablet by  "mouth Daily As Needed for Erectile Dysfunction.      Wheat Dextrin (Benefiber Drink Mix) pack Take 1 each by mouth Daily. 38 each 0     No facility-administered medications prior to visit.       No opioid medication identified on active medication list. I have reviewed chart for other potential  high risk medication/s and harmful drug interactions in the elderly.        Aspirin is not on active medication list.  Aspirin use is indicated based on review of current medical condition/s. Pros and cons of this therapy have been discussed with this patient. Benefits of this medication outweigh potential harm.  Patient has been instructed to start taking this medication..    Patient Active Problem List   Diagnosis    Benign non-nodular prostatic hyperplasia without lower urinary tract symptoms    Chronic fatigue    Solitary thyroid nodule    Erectile dysfunction    S/P hip replacement    Tubular adenoma    Actinic keratosis    Adenomatous polyp of colon    Encounter for subsequent annual wellness visit (AWV) in Medicare patient    Bilateral hearing loss    Varicose veins of left leg with edema    Premature ventricular contractions (PVCs) (VPCs)    Sinus arrhythmia    PVC's (premature ventricular contractions)    NICM (nonischemic cardiomyopathy)    PVC (premature ventricular contraction)    Primary osteoarthritis of left hip    Elevated PSA    Primary hypertension    Personal history of colonic polyps    H/O cardiac radiofrequency ablation     Advance Care Planning   Advance Care Planning     Advance Directive is not on file.  ACP discussion was held with the patient during this visit. Patient does not have an advance directive, information provided.     Objective    Vitals:    01/17/24 1011   Weight: 80.1 kg (176 lb 9.6 oz)   Height: 177.8 cm (70\")     Estimated body mass index is 25.34 kg/m² as calculated from the following:    Height as of this encounter: 177.8 cm (70\").    Weight as of this encounter: 80.1 kg (176 lb " 9.6 oz).    BMI is >= 25 and <30. (Overweight) The following options were offered after discussion;: exercise counseling/recommendations      Does the patient have evidence of cognitive impairment?   No    Lab Results   Component Value Date    CHLPL 183 01/10/2024    TRIG 47 01/10/2024    HDL 59 01/10/2024     (H) 01/10/2024    VLDL 9 01/10/2024          HEALTH RISK ASSESSMENT    Smoking Status:  Social History     Tobacco Use   Smoking Status Never    Passive exposure: Never   Smokeless Tobacco Never     Alcohol Consumption:  Social History     Substance and Sexual Activity   Alcohol Use No     Fall Risk Screen:    STEADI Fall Risk Assessment was completed, and patient is at LOW risk for falls.Assessment completed on:2024    Depression Screenin/17/2024    10:19 AM   PHQ-2/PHQ-9 Depression Screening   Little Interest or Pleasure in Doing Things 0-->not at all   Feeling Down, Depressed or Hopeless 0-->not at all   PHQ-9: Brief Depression Severity Measure Score 0       Health Habits and Functional and Cognitive Screenin/17/2024    10:20 AM   Functional & Cognitive Status   Do you have difficulty preparing food and eating? No   Do you have difficulty bathing yourself, getting dressed or grooming yourself? No   Do you have difficulty using the toilet? No   Do you have difficulty moving around from place to place? No   Do you have trouble with steps or getting out of a bed or a chair? No   Current Diet Well Balanced Diet   Dental Exam Up to date   Eye Exam Up to date   Exercise (times per week) 4 times per week   Current Exercises Include Other        Exercise Comment goes to [employer]   Do you need help using the phone?  No   Are you deaf or do you have serious difficulty hearing?  Yes   Do you need help to go to places out of walking distance? No   Do you need help shopping? No   Do you need help preparing meals?  No   Do you need help with housework?  No   Do you need help  with laundry? No   Do you need help taking your medications? No   Do you need help managing money? No   Do you ever drive or ride in a car without wearing a seat belt? No       Age-appropriate Screening Schedule:  Refer to the list below for future screening recommendations based on patient's age, sex and/or medical conditions. Orders for these recommended tests are listed in the plan section. The patient has been provided with a written plan.    Health Maintenance   Topic Date Due    COVID-19 Vaccine (1) Never done    TDAP/TD VACCINES (1 - Tdap) Never done    ZOSTER VACCINE (1 of 2) Never done    Pneumococcal Vaccine 65+ (2 of 2 - PPSV23 or PCV20) 09/01/2017    ANNUAL WELLNESS VISIT  01/10/2024    LIPID PANEL  01/10/2025    HEPATITIS C SCREENING  Completed    COLORECTAL CANCER SCREENING  Discontinued                  CMS Preventative Services Quick Reference  Risk Factors Identified During Encounter:    Immunizations Discussed/Encouraged: Prevnar 20 (Pneumococcal 20-valent conjugate), Shingrix, COVID19, and RSV (Respiratory Syncytial Virus)    The above risks/problems have been discussed with the patient.  Pertinent information has been shared with the patient in the After Visit Summary.    There are no diagnoses linked to this encounter.    Follow Up:   Next Medicare Wellness visit to be scheduled in 1 year.      An After Visit Summary and PPPS were made available to the patient.

## 2024-03-15 ENCOUNTER — TRANSCRIBE ORDERS (OUTPATIENT)
Dept: ADMINISTRATIVE | Facility: HOSPITAL | Age: 79
End: 2024-03-15
Payer: MEDICARE

## 2024-03-15 ENCOUNTER — HOSPITAL ENCOUNTER (OUTPATIENT)
Dept: CARDIOLOGY | Facility: HOSPITAL | Age: 79
Discharge: HOME | End: 2024-03-15
Payer: MEDICARE

## 2024-03-15 ENCOUNTER — LAB (OUTPATIENT)
Dept: LAB | Facility: HOSPITAL | Age: 79
End: 2024-03-15
Payer: MEDICARE

## 2024-03-15 DIAGNOSIS — Z01.818 PRE-OP EXAMINATION: Primary | ICD-10-CM

## 2024-03-15 DIAGNOSIS — Z01.818 PRE-OP EXAMINATION: ICD-10-CM

## 2024-03-15 LAB
ANION GAP SERPL CALCULATED.3IONS-SCNC: 10.6 MMOL/L (ref 5–15)
BASOPHILS # BLD AUTO: 0.05 10*3/MM3 (ref 0–0.2)
BASOPHILS NFR BLD AUTO: 0.8 % (ref 0–1.5)
BUN SERPL-MCNC: 19 MG/DL (ref 8–23)
BUN/CREAT SERPL: 21.3 (ref 7–25)
CALCIUM SPEC-SCNC: 8.8 MG/DL (ref 8.6–10.5)
CHLORIDE SERPL-SCNC: 107 MMOL/L (ref 98–107)
CO2 SERPL-SCNC: 19.4 MMOL/L (ref 22–29)
CREAT SERPL-MCNC: 0.89 MG/DL (ref 0.76–1.27)
DEPRECATED RDW RBC AUTO: 38.8 FL (ref 37–54)
EGFRCR SERPLBLD CKD-EPI 2021: 88.8 ML/MIN/1.73
EOSINOPHIL # BLD AUTO: 0.22 10*3/MM3 (ref 0–0.4)
EOSINOPHIL NFR BLD AUTO: 3.5 % (ref 0.3–6.2)
ERYTHROCYTE [DISTWIDTH] IN BLOOD BY AUTOMATED COUNT: 12.3 % (ref 12.3–15.4)
GLUCOSE SERPL-MCNC: 108 MG/DL (ref 65–99)
HCT VFR BLD AUTO: 42.1 % (ref 37.5–51)
HGB BLD-MCNC: 13.9 G/DL (ref 13–17.7)
IMM GRANULOCYTES # BLD AUTO: 0.05 10*3/MM3 (ref 0–0.05)
IMM GRANULOCYTES NFR BLD AUTO: 0.8 % (ref 0–0.5)
LYMPHOCYTES # BLD AUTO: 1.57 10*3/MM3 (ref 0.7–3.1)
LYMPHOCYTES NFR BLD AUTO: 25.3 % (ref 19.6–45.3)
MCH RBC QN AUTO: 28.5 PG (ref 26.6–33)
MCHC RBC AUTO-ENTMCNC: 33 G/DL (ref 31.5–35.7)
MCV RBC AUTO: 86.3 FL (ref 79–97)
MONOCYTES # BLD AUTO: 0.56 10*3/MM3 (ref 0.1–0.9)
MONOCYTES NFR BLD AUTO: 9 % (ref 5–12)
NEUTROPHILS NFR BLD AUTO: 3.76 10*3/MM3 (ref 1.7–7)
NEUTROPHILS NFR BLD AUTO: 60.6 % (ref 42.7–76)
NRBC BLD AUTO-RTO: 0 /100 WBC (ref 0–0.2)
PLATELET # BLD AUTO: 192 10*3/MM3 (ref 140–450)
PMV BLD AUTO: 10.4 FL (ref 6–12)
POTASSIUM SERPL-SCNC: 3.9 MMOL/L (ref 3.5–5.2)
QT INTERVAL: 452 MS
QTC INTERVAL: 429 MS
RBC # BLD AUTO: 4.88 10*6/MM3 (ref 4.14–5.8)
SODIUM SERPL-SCNC: 137 MMOL/L (ref 136–145)
WBC NRBC COR # BLD AUTO: 6.21 10*3/MM3 (ref 3.4–10.8)

## 2024-03-15 PROCEDURE — 93005 ELECTROCARDIOGRAM TRACING: CPT

## 2024-03-15 PROCEDURE — 80048 BASIC METABOLIC PNL TOTAL CA: CPT

## 2024-03-15 PROCEDURE — 85025 COMPLETE CBC W/AUTO DIFF WBC: CPT

## 2024-03-15 PROCEDURE — 36415 COLL VENOUS BLD VENIPUNCTURE: CPT

## 2024-03-29 ENCOUNTER — LAB REQUISITION (OUTPATIENT)
Dept: LAB | Facility: HOSPITAL | Age: 79
End: 2024-03-29
Payer: MEDICARE

## 2024-03-29 DIAGNOSIS — M21.6X2 OTHER ACQUIRED DEFORMITIES OF LEFT FOOT: ICD-10-CM

## 2024-03-29 PROCEDURE — 88304 TISSUE EXAM BY PATHOLOGIST: CPT | Performed by: PODIATRIST

## 2024-04-01 LAB
LAB AP CASE REPORT: NORMAL
PATH REPORT.FINAL DX SPEC: NORMAL
PATH REPORT.GROSS SPEC: NORMAL

## 2024-12-04 ENCOUNTER — OFFICE VISIT (OUTPATIENT)
Age: 79
End: 2024-12-04
Payer: MEDICARE

## 2024-12-04 VITALS
SYSTOLIC BLOOD PRESSURE: 130 MMHG | BODY MASS INDEX: 24.77 KG/M2 | HEART RATE: 50 BPM | HEIGHT: 70 IN | WEIGHT: 173 LBS | DIASTOLIC BLOOD PRESSURE: 80 MMHG

## 2024-12-04 DIAGNOSIS — I42.8 NICM (NONISCHEMIC CARDIOMYOPATHY): Primary | ICD-10-CM

## 2024-12-04 DIAGNOSIS — Z98.890 H/O CARDIAC RADIOFREQUENCY ABLATION: ICD-10-CM

## 2024-12-04 DIAGNOSIS — I49.3 PREMATURE VENTRICULAR CONTRACTIONS (PVCS) (VPCS): ICD-10-CM

## 2024-12-04 NOTE — LETTER
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        If further assistance or information is needed, please so advise.               Sincerely,      Caio Chaidez PA-C      CC: No Recipients

## 2024-12-04 NOTE — PROGRESS NOTES
"    CARDIOLOGY        Patient Name: Zachary Zayas  :1945  Age: 79 y.o.  Primary Cardiologist: Sher Camacho MD  Encounter Provider:  Caio Chaidez PA-C    Date of Service: 24            CHIEF COMPLAINT / REASON FOR OFFICE VISIT     Year follow-up      HISTORY OF PRESENT ILLNESS       HPI  Zachary Zayas is a 79 y.o. male who presents today for 1 year follow-up.     Pt has a  history significant for nonischemic cardiomyopathy, PVCs, and history of cardiac ablation presents for 1 year follow-up.  He was last seen in office on 2023 where he had no complaints.  No chest pain, chest pressure, tightness, palpitations, presyncope or syncopal episodes.    Patient has been doing well since his last office visit.  Patient has not had any chest pain, chest pressure, palpitations, presyncope or syncopal episodes.  Patient is active going the gym with his wife.  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    The following portions of the patient's history were reviewed and updated as appropriate: allergies, current medications, past family history, past medical history, past social history, past surgical history and problem list.      VITAL SIGNS     Visit Vitals  /80 (BP Location: Left arm, Patient Position: Sitting, Cuff Size: Adult)   Pulse 50   Ht 177.8 cm (70\")   Wt 78.5 kg (173 lb)   BMI 24.82 kg/m²       @[email or web address]  Wt Readings from Last 3 Encounters:   24 78.5 kg (173 lb)   24 79.8 kg (176 lb)   03/10/24 79.8 kg (176 lb)     Body mass index is 24.82 kg/m².        PHYSICAL EXAMINATION     Constitutional:       General: Awake. Not in acute distress.     Appearance: Not in distress.   Pulmonary:      Effort: Pulmonary effort is normal.      Breath sounds: Normal breath sounds.   Cardiovascular:      Normal rate. Regular rhythm.      Murmurs: There is no murmur.   Edema:     Peripheral edema absent.   Skin:     General: Skin is warm.   Neurological:    " "  Mental Status: Alert.   Psychiatric:         Behavior: Behavior is cooperative.           REVIEWED DATA     Procedures    Cardiac Procedures:    Transthoracic echo on 12/20/2023  Interpretation Summary       Left ventricular systolic function is normal. Calculated left ventricular EF = 60.3%    Left ventricular diastolic function was normal.    Estimated right ventricular systolic pressure from tricuspid regurgitation is normal (<35 mmHg). Calculated right ventricular systolic pressure from tricuspid regurgitation is 24 mmHg.           Lipid Panel          1/10/2024    08:07   Lipid Panel   Total Cholesterol 183    Triglycerides 47    HDL Cholesterol 59    VLDL Cholesterol 9    LDL Cholesterol  115        Lab Results   Component Value Date     03/15/2024     01/10/2024    K 3.9 03/15/2024    K 4.4 01/10/2024     03/15/2024     01/10/2024    CO2 19.4 (L) 03/15/2024    CO2 24.2 01/10/2024    BUN 19 03/15/2024    BUN 19 01/10/2024    CREATININE 0.89 03/15/2024    CREATININE 0.91 01/10/2024    EGFRIFNONA 83 12/30/2021    EGFRIFNONA 92 10/02/2020    EGFRIFAFRI >60 03/07/2023    EGFRIFAFRI 96 12/30/2021    GLUCOSE 108 (H) 03/15/2024    GLUCOSE 98 01/10/2024    CALCIUM 8.8 03/15/2024    CALCIUM 9.7 01/10/2024    PROTENTOTREF 6.3 01/10/2024    PROTENTOTREF 6.0 01/03/2023    ALBUMIN 4.1 01/10/2024    ALBUMIN 4.1 01/03/2023    BILITOT 0.5 01/10/2024    BILITOT 0.6 01/03/2023    AST 21 01/10/2024    AST 19 01/03/2023    ALT 18 01/10/2024    ALT 20 01/03/2023     Lab Results   Component Value Date    WBC 6.21 03/15/2024    WBC 5.24 01/10/2024    HGB 13.9 03/15/2024    HGB 13.8 01/10/2024    HCT 42.1 03/15/2024    HCT 42.6 01/10/2024    MCV 86.3 03/15/2024    MCV 86.9 01/10/2024     03/15/2024     01/10/2024     No results found for: \"PROBNP\", \"BNP\"  No results found for: \"CKTOTAL\", \"CKMB\", \"CKMBINDEX\", \"TROPONINI\", \"TROPONINT\"  Lab Results   Component Value Date    TSH 3.510 01/10/2024    " TSH 3.950 12/30/2021             ASSESSMENT & PLAN     Diagnoses and all orders for this visit:    1. NICM (nonischemic cardiomyopathy) (Primary)    2. Premature ventricular contractions (PVCs) (VPCs)    3. H/O cardiac radiofrequency ablation    1.  Nonischemic cardiomyopathy-ejection fraction 41% on the stress echocardiogram in 2020, we will repeat an echocardiogram  2.  Frequent PVCs-status post ablation in 2020, no evidence of recurrent significant ventricular ectopy.     Patient doing very well.  I will send a letter with him for his DOT clearance.  He has no complaints.  He will follow-up in 1 year or sooner if any issues,.    Return in about 1 year (around 12/4/2025) for Dr. Camacho.    Future Appointments         Provider Department Center    1/15/2025 9:00 AM [facility] PRIMARY CARE [facility]    1/22/2025 8:00 AM Marianne Wasserman APRN [facility] PRIMARY CARE [facility]    12/10/2025 10:30 AM Sher Camacho III, MD [facility] CARDIOLOGY [facility]                MEDICATIONS         Discharge Medications            Accurate as of December 4, 2024  9:14 AM. If you have any questions, ask your nurse or doctor.                Continue These Medications        Instructions Start Date   Benefiber Drink Mix pack   1 each, Oral, Daily      CALCIUM 600 + D PO   1 tablet, Daily      carvedilol 3.125 MG tablet  Commonly known as: COREG   3.125 mg, Oral, 2 Times Daily      hypromellose 0.4 % solution  Commonly known as: NATURES TEARS   1 drop, As Needed      PATIENT SUPPLIED ALLERGY INJECTION   Every 30 Days      SAW PALMETTO COMPLEX EX ST PO   2 tablets, Daily      tadalafil 10 MG tablet  Commonly known as: CIALIS   10 mg, Daily PRN                   **Dragon Disclaimer:   Much of this encounter note is an electronic transcription/translation of spoken language to printed text. The electronic translation of spoken language may permit erroneous, or at times, nonsensical  words or phrases to be inadvertently transcribed. Although I have reviewed the note for such errors, some may still exist.

## 2024-12-20 RX ORDER — CARVEDILOL 3.12 MG/1
3.12 TABLET ORAL 2 TIMES DAILY
Qty: 180 TABLET | Refills: 0 | Status: SHIPPED | OUTPATIENT
Start: 2024-12-20

## 2025-01-09 ENCOUNTER — TELEPHONE (OUTPATIENT)
Dept: INTERNAL MEDICINE | Facility: CLINIC | Age: 80
End: 2025-01-09
Payer: MEDICARE

## 2025-01-14 DIAGNOSIS — E04.1 SOLITARY THYROID NODULE: ICD-10-CM

## 2025-01-14 DIAGNOSIS — R97.20 ELEVATED PSA: ICD-10-CM

## 2025-01-14 DIAGNOSIS — I49.3 PVC (PREMATURE VENTRICULAR CONTRACTION): ICD-10-CM

## 2025-01-14 DIAGNOSIS — I10 PRIMARY HYPERTENSION: Primary | ICD-10-CM

## 2025-01-15 ENCOUNTER — LAB (OUTPATIENT)
Dept: INTERNAL MEDICINE | Facility: CLINIC | Age: 80
End: 2025-01-15
Payer: MEDICARE

## 2025-01-15 DIAGNOSIS — E04.1 SOLITARY THYROID NODULE: ICD-10-CM

## 2025-01-15 DIAGNOSIS — R97.20 ELEVATED PSA: ICD-10-CM

## 2025-01-15 DIAGNOSIS — I10 PRIMARY HYPERTENSION: ICD-10-CM

## 2025-01-15 DIAGNOSIS — I49.3 PVC (PREMATURE VENTRICULAR CONTRACTION): ICD-10-CM

## 2025-01-16 LAB
ALBUMIN SERPL-MCNC: 3.7 G/DL (ref 3.5–5.2)
ALBUMIN/GLOB SERPL: 1.7 G/DL
ALP SERPL-CCNC: 78 U/L (ref 39–117)
ALT SERPL-CCNC: 18 U/L (ref 1–41)
AST SERPL-CCNC: 24 U/L (ref 1–40)
BASOPHILS # BLD AUTO: 0.06 10*3/MM3 (ref 0–0.2)
BASOPHILS NFR BLD AUTO: 1 % (ref 0–1.5)
BILIRUB SERPL-MCNC: 0.6 MG/DL (ref 0–1.2)
BUN SERPL-MCNC: 18 MG/DL (ref 8–23)
BUN/CREAT SERPL: 19.8 (ref 7–25)
CALCIUM SERPL-MCNC: 9.4 MG/DL (ref 8.6–10.5)
CHLORIDE SERPL-SCNC: 108 MMOL/L (ref 98–107)
CHOLEST SERPL-MCNC: 178 MG/DL (ref 0–200)
CHOLEST/HDLC SERPL: 3.3 {RATIO}
CO2 SERPL-SCNC: 24.5 MMOL/L (ref 22–29)
CREAT SERPL-MCNC: 0.91 MG/DL (ref 0.76–1.27)
EGFRCR SERPLBLD CKD-EPI 2021: 86.8 ML/MIN/1.73
EOSINOPHIL # BLD AUTO: 0.21 10*3/MM3 (ref 0–0.4)
EOSINOPHIL NFR BLD AUTO: 3.4 % (ref 0.3–6.2)
ERYTHROCYTE [DISTWIDTH] IN BLOOD BY AUTOMATED COUNT: 12 % (ref 12.3–15.4)
GLOBULIN SER CALC-MCNC: 2.2 GM/DL
GLUCOSE SERPL-MCNC: 94 MG/DL (ref 65–99)
HCT VFR BLD AUTO: 41.7 % (ref 37.5–51)
HDLC SERPL-MCNC: 54 MG/DL (ref 40–60)
HGB BLD-MCNC: 14.1 G/DL (ref 13–17.7)
IMM GRANULOCYTES # BLD AUTO: 0.04 10*3/MM3 (ref 0–0.05)
IMM GRANULOCYTES NFR BLD AUTO: 0.7 % (ref 0–0.5)
LDLC SERPL CALC-MCNC: 116 MG/DL (ref 0–100)
LYMPHOCYTES # BLD AUTO: 1.09 10*3/MM3 (ref 0.7–3.1)
LYMPHOCYTES NFR BLD AUTO: 17.9 % (ref 19.6–45.3)
MCH RBC QN AUTO: 29.5 PG (ref 26.6–33)
MCHC RBC AUTO-ENTMCNC: 33.8 G/DL (ref 31.5–35.7)
MCV RBC AUTO: 87.2 FL (ref 79–97)
MONOCYTES # BLD AUTO: 0.61 10*3/MM3 (ref 0.1–0.9)
MONOCYTES NFR BLD AUTO: 10 % (ref 5–12)
NEUTROPHILS # BLD AUTO: 4.09 10*3/MM3 (ref 1.7–7)
NEUTROPHILS NFR BLD AUTO: 67 % (ref 42.7–76)
NRBC BLD AUTO-RTO: 0 /100 WBC (ref 0–0.2)
PLATELET # BLD AUTO: 170 10*3/MM3 (ref 140–450)
POTASSIUM SERPL-SCNC: 4.6 MMOL/L (ref 3.5–5.2)
PROT SERPL-MCNC: 5.9 G/DL (ref 6–8.5)
PSA SERPL-MCNC: 4.54 NG/ML (ref 0–4)
RBC # BLD AUTO: 4.78 10*6/MM3 (ref 4.14–5.8)
SODIUM SERPL-SCNC: 143 MMOL/L (ref 136–145)
TRIGL SERPL-MCNC: 40 MG/DL (ref 0–150)
TSH SERPL DL<=0.005 MIU/L-ACNC: 2.94 UIU/ML (ref 0.45–4.5)
VLDLC SERPL CALC-MCNC: 8 MG/DL (ref 5–40)
WBC # BLD AUTO: 6.1 10*3/MM3 (ref 3.4–10.8)

## 2025-01-22 ENCOUNTER — OFFICE VISIT (OUTPATIENT)
Dept: INTERNAL MEDICINE | Facility: CLINIC | Age: 80
End: 2025-01-22
Payer: MEDICARE

## 2025-01-22 VITALS
BODY MASS INDEX: 25.08 KG/M2 | HEART RATE: 61 BPM | WEIGHT: 175.2 LBS | TEMPERATURE: 98.4 F | HEIGHT: 70 IN | SYSTOLIC BLOOD PRESSURE: 128 MMHG | DIASTOLIC BLOOD PRESSURE: 76 MMHG | OXYGEN SATURATION: 98 %

## 2025-01-22 DIAGNOSIS — Z13.1 SCREENING FOR DIABETES MELLITUS: ICD-10-CM

## 2025-01-22 DIAGNOSIS — Z12.5 SCREENING PSA (PROSTATE SPECIFIC ANTIGEN): ICD-10-CM

## 2025-01-22 DIAGNOSIS — Z13.220 SCREENING, LIPID: ICD-10-CM

## 2025-01-22 DIAGNOSIS — Z00.00 ENCOUNTER FOR ANNUAL WELLNESS VISIT (AWV) IN MEDICARE PATIENT: Primary | ICD-10-CM

## 2025-01-22 DIAGNOSIS — Z13.0 SCREENING, ANEMIA, DEFICIENCY, IRON: ICD-10-CM

## 2025-01-22 PROCEDURE — 1126F AMNT PAIN NOTED NONE PRSNT: CPT | Performed by: NURSE PRACTITIONER

## 2025-01-22 PROCEDURE — 1170F FXNL STATUS ASSESSED: CPT | Performed by: NURSE PRACTITIONER

## 2025-01-22 PROCEDURE — G0439 PPPS, SUBSEQ VISIT: HCPCS | Performed by: NURSE PRACTITIONER

## 2025-01-22 PROCEDURE — 3074F SYST BP LT 130 MM HG: CPT | Performed by: NURSE PRACTITIONER

## 2025-01-22 PROCEDURE — 3078F DIAST BP <80 MM HG: CPT | Performed by: NURSE PRACTITIONER

## 2025-01-22 NOTE — PROGRESS NOTES
Subjective   The ABCs of the Annual Wellness Visit  Medicare Wellness Visit      Zachary Zayas is a 79 y.o. patient who presents for a Medicare Wellness Visit.    The following portions of the patient's history were reviewed and   updated as appropriate: allergies, current medications, past family history, past medical history, past social history, past surgical history, and problem list.    Compared to one year ago, the patient's physical   health is the same.  Compared to one year ago, the patient's mental   health is the same.    Recent Hospitalizations:  He was not admitted to the hospital during the last year.     Current Medical Providers:  Patient Care Team:  Marianne Wasserman APRN as PCP - General (Family Medicine)  Gianni Tidwell MD as Surgeon (Orthopedic Surgery)  Abisai Blanca MD as Consulting Physician (Cardiology)  Abdirizak Holland MD as Consulting Physician (Allergy and Immunology)  Jose Nash MD as Consulting Physician (Urology)  Jeff Polo Jr., DPM as Consulting Physician (Podiatry)  Sher Camacho III, MD as Consulting Physician (Cardiology)  Abdirizak Holland MD as Consulting Physician (Allergy and Immunology)  Fred Fang MD as Consulting Physician (Gastroenterology)  Rafal Mejia MD (Dental General Practice)  Ruth Haney DC (Chiropractic Medicine)    Outpatient Medications Prior to Visit   Medication Sig Dispense Refill    Calcium Carb-Cholecalciferol (CALCIUM 600 + D PO) Take 1 tablet by mouth Daily.      carvedilol (COREG) 3.125 MG tablet Take 1 tablet by mouth twice daily 180 tablet 0    hypromellose (NATURES TEARS) 0.4 % solution 1 drop As Needed for Dry Eyes.      Misc Natural Products (SAW PALMETTO COMPLEX EX ST PO) Take 2 tablets by mouth Daily.      PATIENT SUPPLIED ALLERGY INJECTION Inject  under the skin into the appropriate area as directed Every 30 (Thirty) Days.      tadalafil (CIALIS) 10 MG tablet Take 1  "tablet by mouth Daily As Needed for Erectile Dysfunction.      Wheat Dextrin (Benefiber Drink Mix) pack Take 1 each by mouth Daily. 38 each 0     No facility-administered medications prior to visit.     No opioid medication identified on active medication list. I have reviewed chart for other potential  high risk medication/s and harmful drug interactions in the elderly.      Aspirin is not on active medication list.  Aspirin use is not indicated based on review of current medical condition/s. Risk of harm outweighs potential benefits.  .    Patient Active Problem List   Diagnosis    Benign non-nodular prostatic hyperplasia without lower urinary tract symptoms    Chronic fatigue    Solitary thyroid nodule    Erectile dysfunction    S/P hip replacement    Tubular adenoma    Actinic keratosis    Adenomatous polyp of colon    Encounter for annual wellness visit (AWV) in Medicare patient    Bilateral hearing loss    Varicose veins of left leg with edema    Premature ventricular contractions (PVCs) (VPCs)    Sinus arrhythmia    PVC's (premature ventricular contractions)    NICM (nonischemic cardiomyopathy)    PVC (premature ventricular contraction)    Primary osteoarthritis of left hip    Elevated PSA    Primary hypertension    Personal history of colonic polyps    H/O cardiac radiofrequency ablation     Advance Care Planning Advance Directive is not on file.  ACP discussion was held with the patient during this visit. Patient does not have an advance directive, information provided.            Objective   Vitals:    01/22/25 0807   BP: 128/76   BP Location: Left arm   Patient Position: Sitting   Cuff Size: Adult   Pulse: 61   Temp: 98.4 °F (36.9 °C)   TempSrc: Infrared   SpO2: 98%   Weight: 79.5 kg (175 lb 3.2 oz)   Height: 177.8 cm (70\")   PainSc: 0-No pain       Estimated body mass index is 25.14 kg/m² as calculated from the following:    Height as of this encounter: 177.8 cm (70\").    Weight as of this encounter: " 79.5 kg (175 lb 3.2 oz).    BMI is >= 25 and <30. (Overweight) The following options were offered after discussion;: exercise counseling/recommendations and nutrition counseling/recommendations           Does the patient have evidence of cognitive impairment? No  Lab Results   Component Value Date    CHLPL 178 01/15/2025    TRIG 40 01/15/2025    HDL 54 01/15/2025     (H) 01/15/2025    VLDL 8 01/15/2025                                                                                                Health  Risk Assessment    Smoking Status:  Social History     Tobacco Use   Smoking Status Never    Passive exposure: Never   Smokeless Tobacco Never   Tobacco Comments    No caffeine      Alcohol Consumption:  Social History     Substance and Sexual Activity   Alcohol Use No       Fall Risk Screen  STEADI Fall Risk Assessment was completed, and patient is at LOW risk for falls.Assessment completed on:2025    Depression Screening   Little interest or pleasure in doing things? Not at all   Feeling down, depressed, or hopeless? Not at all   PHQ-2 Total Score 0      Health Habits and Functional and Cognitive Screenin/22/2025     8:09 AM   Functional & Cognitive Status   Do you have difficulty preparing food and eating? No   Do you have difficulty bathing yourself, getting dressed or grooming yourself? No   Do you have difficulty using the toilet? No   Do you have difficulty moving around from place to place? No   Do you have trouble with steps or getting out of a bed or a chair? No   Current Diet Well Balanced Diet   Dental Exam Up to date   Eye Exam Up to date   Exercise (times per week) 4 times per week   Current Exercises Include Treadmill   Do you need help using the phone?  No   Are you deaf or do you have serious difficulty hearing?  Yes   Do you need help to go to places out of walking distance? No   Do you need help shopping? No   Do you need help preparing meals?  No   Do you need help with  housework?  No   Do you need help with laundry? No   Do you need help taking your medications? No   Do you need help managing money? No   Do you ever drive or ride in a car without wearing a seat belt? No   Have you felt unusual stress, anger or loneliness in the last month? No   Who do you live with? Spouse   If you need help, do you have trouble finding someone available to you? No   Have you been bothered in the last four weeks by sexual problems? No   Do you have difficulty concentrating, remembering or making decisions? No           Age-appropriate Screening Schedule:  Refer to the list below for future screening recommendations based on patient's age, sex and/or medical conditions. Orders for these recommended tests are listed in the plan section. The patient has been provided with a written plan.    Health Maintenance List  Health Maintenance   Topic Date Due    ZOSTER VACCINE (1 of 2) Never done    RSV Vaccine - Adults (1 - 1-dose 75+ series) Never done    ANNUAL WELLNESS VISIT  01/17/2025    BMI FOLLOWUP  01/17/2025    COVID-19 Vaccine (1 - 2024-25 season) 01/24/2025 (Originally 9/1/2024)    Pneumococcal Vaccine 65+ (2 of 2 - PPSV23 or PCV20) 01/22/2026 (Originally 10/27/2016)    LIPID PANEL  01/15/2026    TDAP/TD VACCINES (2 - Td or Tdap) 06/20/2034    HEPATITIS C SCREENING  Completed    COLORECTAL CANCER SCREENING  Discontinued                                                                                                                                                CMS Preventative Services Quick Reference  Risk Factors Identified During Encounter  Immunizations Discussed/Encouraged: Influenza, Prevnar 20 (Pneumococcal 20-valent conjugate), Shingrix, COVID19, and RSV (Respiratory Syncytial Virus)    The above risks/problems have been discussed with the patient.  Pertinent information has been shared with the patient in the After Visit Summary.  An After Visit Summary and PPPS were made available to the  "patient.    Follow Up:   Next Medicare Wellness visit to be scheduled in 1 year.         Additional E&M Note during same encounter follows:  Patient has additional, significant, and separately identifiable condition(s)/problem(s) that require work above and beyond the Medicare Wellness Visit     Chief Complaint  Medicare Wellness-subsequent    Subjective   HPI  Zachary is also being seen today for additional medical problem/s.    Review of Systems   Constitutional: Negative.    HENT:  Positive for rhinorrhea.    Eyes: Negative.    Respiratory: Negative.  Negative for shortness of breath, wheezing and stridor.    Cardiovascular:  Negative for chest pain and leg swelling.   Musculoskeletal:  Positive for arthralgias (knees).   Allergic/Immunologic: Positive for environmental allergies.   Hematological: Negative.    Psychiatric/Behavioral: Negative.     All other systems reviewed and are negative.             Objective   Vital Signs:  /76 (BP Location: Left arm, Patient Position: Sitting, Cuff Size: Adult)   Pulse 61   Temp 98.4 °F (36.9 °C) (Infrared)   Ht 177.8 cm (70\")   Wt 79.5 kg (175 lb 3.2 oz)   SpO2 98%   BMI 25.14 kg/m²   Physical Exam  Vitals reviewed.   Constitutional:       Appearance: Normal appearance. He is not ill-appearing.   HENT:      Head: Normocephalic.      Right Ear: Decreased hearing noted. There is no impacted cerumen.      Left Ear: Decreased hearing noted. There is no impacted cerumen.      Ears:      Comments: Wwearing hearing aides     Nose: No congestion or rhinorrhea.   Cardiovascular:      Rate and Rhythm: Normal rate and regular rhythm.      Pulses: Normal pulses.      Heart sounds: Normal heart sounds. No murmur heard.  Pulmonary:      Effort: Pulmonary effort is normal. No respiratory distress.      Breath sounds: Normal breath sounds. No stridor.   Abdominal:      General: Abdomen is flat. There is no distension.      Palpations: Abdomen is soft. There is no mass. "   Musculoskeletal:      Cervical back: Neck supple.      Right lower leg: No edema.   Neurological:      General: No focal deficit present.      Mental Status: He is alert and oriented to person, place, and time.   Psychiatric:         Mood and Affect: Mood normal.         Behavior: Behavior normal.         Thought Content: Thought content normal.         The following data was reviewed by: JESSE Payne on 01/22/2025:  Data reviewed : Consultant notes cardio and urology  CBC w/diff          3/15/2024    07:00 1/15/2025    08:47   CBC w/Diff   WBC 6.21  6.10    RBC 4.88  4.78    Hemoglobin 13.9  14.1    Hematocrit 42.1  41.7    MCV 86.3  87.2    MCH 28.5  29.5    MCHC 33.0  33.8    RDW 12.3  12.0    Platelets 192  170    Neutrophil Rel % 60.6  67.0    Immature Granulocyte Rel % 0.8     Lymphocyte Rel % 25.3  17.9    Monocyte Rel % 9.0  10.0    Eosinophil Rel % 3.5  3.4    Basophil Rel % 0.8  1.0      Lipid Panel          1/15/2025    08:47   Lipid Panel   Total Cholesterol 178    Triglycerides 40    HDL Cholesterol 54    VLDL Cholesterol 8    LDL Cholesterol  116      TSH          1/15/2025    08:47   TSH   TSH 2.940      Renal Profile          3/15/2024    07:00 1/15/2025    08:47   Renal Profile   BUN 19  18    Creatinine 0.89  0.91      BMP          3/15/2024    07:00 1/15/2025    08:47   BMP   BUN 19  18    Creatinine 0.89  0.91    Sodium 137  143    Potassium 3.9  4.6    Chloride 107  108    CO2 19.4  24.5    Calcium 8.8  9.4      HgB          3/15/2024    07:00 1/15/2025    08:47   HGB   Hemoglobin 13.9  14.1      PSA          1/15/2025    08:47   PSA   PSA 4.540              Assessment and Plan Additional age appropriate preventative wellness advice topics were discussed during today's preventative wellness exam(some topics already addressed during AWV portion of the note above):    Physical Activity: Advised cardiovascular activity 150 minutes per week as tolerated. (example brisk walk for 30 minutes, 5  days a week).           Encounter for annual wellness visit (AWV) in Medicare patient               I spent 20 minutes caring for Zachary on this date of service. This time includes time spent by me in the following activities:preparing for the visit, reviewing tests, obtaining and/or reviewing a separately obtained history, performing a medically appropriate examination and/or evaluation , counseling and educating the patient/family/caregiver, ordering medications, tests, or procedures, referring and communicating with other health care professionals , and documenting information in the medical record  Follow Up   1 year w AWV  Patient was given instructions and counseling regarding his condition or for health maintenance advice. Please see specific information pulled into the AVS if appropriate.

## 2025-02-19 ENCOUNTER — TELEPHONE (OUTPATIENT)
Dept: INTERNAL MEDICINE | Facility: CLINIC | Age: 80
End: 2025-02-19

## 2025-02-19 NOTE — TELEPHONE ENCOUNTER
Caller: Zachary Zayas    Relationship: Self    Best call back number: [phone]       Who are you requesting to speak with (clinical staff, provider,  specific staff member): CLINICAL     What was the call regarding: PATIENT STATES HE IS HAVING STOMACH PAINS AND WOULD LIKE ADVISE ON WHAT THE NEXT STEP HE SHOULD TAKE    Is it okay if the provider responds through MyChart: NO

## 2025-02-21 ENCOUNTER — OFFICE VISIT (OUTPATIENT)
Dept: INTERNAL MEDICINE | Facility: CLINIC | Age: 80
End: 2025-02-21
Payer: MEDICARE

## 2025-02-21 VITALS
HEART RATE: 69 BPM | TEMPERATURE: 98.4 F | OXYGEN SATURATION: 98 % | SYSTOLIC BLOOD PRESSURE: 124 MMHG | HEIGHT: 70 IN | WEIGHT: 171 LBS | DIASTOLIC BLOOD PRESSURE: 70 MMHG | BODY MASS INDEX: 24.48 KG/M2

## 2025-02-21 DIAGNOSIS — K57.92 DIVERTICULITIS: Primary | ICD-10-CM

## 2025-02-21 PROCEDURE — 99213 OFFICE O/P EST LOW 20 MIN: CPT | Performed by: NURSE PRACTITIONER

## 2025-02-21 PROCEDURE — 3078F DIAST BP <80 MM HG: CPT | Performed by: NURSE PRACTITIONER

## 2025-02-21 PROCEDURE — 3074F SYST BP LT 130 MM HG: CPT | Performed by: NURSE PRACTITIONER

## 2025-02-21 PROCEDURE — 1126F AMNT PAIN NOTED NONE PRSNT: CPT | Performed by: NURSE PRACTITIONER

## 2025-02-21 RX ORDER — CIPROFLOXACIN 500 MG/1
500 TABLET, FILM COATED ORAL 2 TIMES DAILY
Qty: 10 TABLET | Refills: 0 | Status: SHIPPED | OUTPATIENT
Start: 2025-02-21

## 2025-02-21 NOTE — PROGRESS NOTES
"Chief Complaint   Patient presents with    Abdominal Pain     stomach pains on and off for about a month. not all the time.       Subjective     Zachary Zayas is a 79 y.o. male being seen for an acute visit for ab pain with constipation or 4 weeks.     History of Present Illness     It began 4 weeks ago and has been intermittent since then. This started in [place]Q with some mild constipation. Described as aching with period sharp pain. No nausea, indigestion, or blood in stool. He took Mirlax and take benefiber once daily. His last colonoscopy was 7- showing 7 polyp and diverticulosis.         Allergies   Allergen Reactions    Influenza Vaccines Other (See Comments)     Declines due to reaction \"3 years in a row.\" States the flu vaccine makes him sick.     Lactose Intolerance (Gi) GI Intolerance    Msm [Methylsulfonylmethane] Other (See Comments)     Raises BP         Current Outpatient Medications:     Calcium Carb-Cholecalciferol (CALCIUM 600 + D PO), Take 1 tablet by mouth Daily., Disp: , Rfl:     carvedilol (COREG) 3.125 MG tablet, Take 1 tablet by mouth twice daily, Disp: 180 tablet, Rfl: 0    hypromellose (NATURES TEARS) 0.4 % solution, 1 drop As Needed for Dry Eyes., Disp: , Rfl:     PATIENT SUPPLIED ALLERGY INJECTION, Inject  under the skin into the appropriate area as directed Every 30 (Thirty) Days., Disp: , Rfl:     tadalafil (CIALIS) 10 MG tablet, Take 1 tablet by mouth Daily As Needed for Erectile Dysfunction., Disp: , Rfl:     Wheat Dextrin (Benefiber Drink Mix) pack, Take 1 each by mouth Daily., Disp: 38 each, Rfl: 0    The following portions of the patient's history were reviewed and updated as appropriate: allergies, current medications, past family history, past medical history, past social history, past surgical history, and problem list.    Review of Systems   Constitutional: Negative.    HENT: Negative.     Respiratory: Negative.     Cardiovascular: Negative.  Negative for chest pain, " palpitations and leg swelling.   Gastrointestinal:  Positive for abdominal pain. Negative for anal bleeding, blood in stool, constipation and diarrhea.   Genitourinary: Negative.    All other systems reviewed and are negative.      Assessment     Physical Exam  Vitals reviewed.   Constitutional:       Appearance: Normal appearance.   Cardiovascular:      Rate and Rhythm: Normal rate and regular rhythm.      Pulses: Normal pulses.      Heart sounds: Normal heart sounds. No murmur heard.  Pulmonary:      Effort: Pulmonary effort is normal.      Breath sounds: Normal breath sounds.   Abdominal:      General: There is no distension.      Tenderness: There is abdominal tenderness in the left lower quadrant.   Skin:     General: Skin is warm and dry.   Neurological:      Mental Status: He is alert.   Psychiatric:         Mood and Affect: Mood normal.         Behavior: Behavior normal.         Thought Content: Thought content normal.         Plan     His colonoscopy was reviewed from 7-2022:     Diagnoses and all orders for this visit:    1. Diverticulitis (Primary)  -     ciprofloxacin (Cipro) 500 MG tablet; Take 1 tablet by mouth 2 (Two) Times a Day.  Dispense: 10 tablet; Refill: 0    Discussed diagnosis and treatment of diverticulitis. Hold Calcium while on Antibiotic. Clear liquids and advance to full liquid tomorrow. Advance diet as tolerated after 48hours, but No red meat for 5 days.     Follow up if pain persists

## 2025-03-17 RX ORDER — CARVEDILOL 3.12 MG/1
3.12 TABLET ORAL 2 TIMES DAILY
Qty: 180 TABLET | Refills: 2 | Status: SHIPPED | OUTPATIENT
Start: 2025-03-17

## 2025-04-07 ENCOUNTER — TELEPHONE (OUTPATIENT)
Dept: INTERNAL MEDICINE | Facility: CLINIC | Age: 80
End: 2025-04-07

## 2025-04-07 NOTE — TELEPHONE ENCOUNTER
Caller: Zachary Zayas    Relationship: Self    Best call back number:[phone]    What form or medical record are you requesting: HANDICAP PARKING PASS    If pick-up, provide patient with address and location details PLEASE CALL WHEN READY FOR        Additional notes: CURRENT PASS EXPIRES END OF APRIL

## 2025-07-24 ENCOUNTER — TELEPHONE (OUTPATIENT)
Dept: INTERNAL MEDICINE | Facility: CLINIC | Age: 80
End: 2025-07-24
Payer: MEDICARE

## 2025-07-24 NOTE — TELEPHONE ENCOUNTER
Pt in office   [phone]    Dropped off a form for PCP to fill out     For [employer] (Physician Statement)     Document scanned in and place in providers mailbox     Please complete the form and call pt when it is ready for

## 2025-08-01 NOTE — TELEPHONE ENCOUNTER
RELAY      Patient contacted - voicemail left to notify patient his form is ready for pickup at the . Also, if there is a fax number, to call with that.

## (undated) DEVICE — SNAR POLYP CAPTIFLX WD OVL 27MM 240CM

## (undated) DEVICE — Device: Brand: TORAYGUIDE GUIDEWIRE

## (undated) DEVICE — KT MANIFLD CARDIAC

## (undated) DEVICE — VIAL FORMALIN CAP 10P 40ML

## (undated) DEVICE — Device

## (undated) DEVICE — DESTINATION PERIPHERAL GUIDING SHEATH: Brand: DESTINATION

## (undated) DEVICE — AIRLIFE™ NASAL OXYGEN CANNULA CURVED, NONFLARED TIP WITH 21 FOOT (6.4 M) CRUSH-RESISTANT TUBING, OVER-THE-EAR STYLE: Brand: AIRLIFE™

## (undated) DEVICE — ENDO. PORT CONNECTOR W/VALVE FOR OLYMPUS® SCOPES: Brand: ERBE

## (undated) DEVICE — GLV SURG SENSICARE PI MIC PF SZ7.5 LF STRL

## (undated) DEVICE — Device: Brand: SMARTABLATE

## (undated) DEVICE — CATH DIAG IMPULSE FL4 5F 100CM

## (undated) DEVICE — BW-412T DISP COMBO CLEANING BRUSH: Brand: SINGLE USE COMBINATION CLEANING BRUSH

## (undated) DEVICE — PINNACLE INTRODUCER SHEATH: Brand: PINNACLE

## (undated) DEVICE — THE SINGLE USE ETRAP – POLYP TRAP IS USED FOR SUCTION RETRIEVAL OF ENDOSCOPICALLY REMOVED POLYPS.: Brand: ETRAP

## (undated) DEVICE — HYBRID TUBING/CAP SET FOR OLYMPUS® SCOPES: Brand: ERBE

## (undated) DEVICE — MASK,FACE,FLUID RESIST,SHLD,EARLOOP: Brand: MEDLINE

## (undated) DEVICE — ADAPT CLN BIOGUARD AIR/H2O DISP

## (undated) DEVICE — Device: Brand: THERMOCOOL SMARTTOUCH SF

## (undated) DEVICE — Device: Brand: WEBSTER

## (undated) DEVICE — Device: Brand: SOUNDSTAR

## (undated) DEVICE — Device: Brand: DECANAV

## (undated) DEVICE — TRAP POLYP 4CHAMBER

## (undated) DEVICE — INTRO SHEATH MOBICATH BI DIR 8.5F 3092CM

## (undated) DEVICE — ELECTRD ECG CARBON/SNP RL FM A/ 5PK

## (undated) DEVICE — JACKT LAB F/R KNIT CUFF/COLR XLG BLU

## (undated) DEVICE — LIMB HOLDER, WRIST/ANKLE: Brand: DEROYAL

## (undated) DEVICE — Device: Brand: THERMOCOOL SF NAV

## (undated) DEVICE — PREF.GUIDING SHEATH W/MULT.CRV: Brand: PREFACE

## (undated) DEVICE — PRESSURE MONITORING SET: Brand: TRUWAVE

## (undated) DEVICE — Device: Brand: REFERENCE PATCH CARTO 3

## (undated) DEVICE — FRCP BX RADJAW4 NDL 2.8 240CM LG OG BX40

## (undated) DEVICE — SPNG GZ WOVN 4X4IN 12PLY 10/BX STRL

## (undated) DEVICE — SUCTION CANISTER, 3000CC,SAFELINER: Brand: DEROYAL

## (undated) DEVICE — Device: Brand: DEFENDO AIR/WATER/SUCTION AND BIOPSY VALVE

## (undated) DEVICE — GW EMR FIX EXCHG J STD .035 3MM 260CM

## (undated) DEVICE — GOWN ISOL W/THUMB UNIV BLU BX/15

## (undated) DEVICE — SYR LL 3CC

## (undated) DEVICE — KT ORCA ORCAPOD DISP STRL

## (undated) DEVICE — SYS TRNSEP ACC ACROSS ADLT BRK1 71CM

## (undated) DEVICE — LOU EP: Brand: MEDLINE INDUSTRIES, INC.

## (undated) DEVICE — SAFELINER SUCTION CANISTER 1000CC: Brand: DEROYAL

## (undated) DEVICE — SNAR POLYP CAPTIFLX MICRO OVL 13MM 240CM

## (undated) DEVICE — GLV SURG SENSICARE MICRO PF LF 7.5 STRL

## (undated) DEVICE — Device: Brand: WEBSTER CS

## (undated) DEVICE — INTRO SHEATH ART/FEM ENGAGE .035 4F12CM